# Patient Record
Sex: MALE | Race: WHITE | Employment: OTHER | URBAN - METROPOLITAN AREA
[De-identification: names, ages, dates, MRNs, and addresses within clinical notes are randomized per-mention and may not be internally consistent; named-entity substitution may affect disease eponyms.]

---

## 2018-05-18 ENCOUNTER — OFFICE VISIT (OUTPATIENT)
Dept: FAMILY MEDICINE CLINIC | Age: 33
End: 2018-05-18

## 2018-05-18 VITALS
HEART RATE: 86 BPM | SYSTOLIC BLOOD PRESSURE: 132 MMHG | WEIGHT: 315 LBS | DIASTOLIC BLOOD PRESSURE: 70 MMHG | TEMPERATURE: 98.8 F | RESPIRATION RATE: 16 BRPM | BODY MASS INDEX: 44.1 KG/M2 | OXYGEN SATURATION: 98 % | HEIGHT: 71 IN

## 2018-05-18 DIAGNOSIS — F32.A DEPRESSION, UNSPECIFIED DEPRESSION TYPE: ICD-10-CM

## 2018-05-18 DIAGNOSIS — R19.8 GI SYMPTOMS: Primary | ICD-10-CM

## 2018-05-18 DIAGNOSIS — Z86.19 HISTORY OF HEPATITIS C: ICD-10-CM

## 2018-05-18 PROCEDURE — G8427 DOCREV CUR MEDS BY ELIG CLIN: HCPCS | Performed by: FAMILY MEDICINE

## 2018-05-18 PROCEDURE — 99203 OFFICE O/P NEW LOW 30 MIN: CPT | Performed by: FAMILY MEDICINE

## 2018-05-18 PROCEDURE — 1036F TOBACCO NON-USER: CPT | Performed by: FAMILY MEDICINE

## 2018-05-18 PROCEDURE — G8417 CALC BMI ABV UP PARAM F/U: HCPCS | Performed by: FAMILY MEDICINE

## 2018-05-18 RX ORDER — IBUPROFEN 200 MG
200 TABLET ORAL EVERY 6 HOURS PRN
COMMUNITY
End: 2020-09-25

## 2018-05-18 RX ORDER — SIMETHICONE 80 MG
80 TABLET,CHEWABLE ORAL EVERY 6 HOURS PRN
Qty: 30 TABLET | Refills: 0 | Status: SHIPPED | OUTPATIENT
Start: 2018-05-18 | End: 2020-09-25

## 2018-05-18 RX ORDER — DICYCLOMINE HYDROCHLORIDE 10 MG/1
10 CAPSULE ORAL
Qty: 30 CAPSULE | Refills: 0 | Status: SHIPPED | OUTPATIENT
Start: 2018-05-18 | End: 2020-09-25

## 2018-05-18 RX ORDER — PANTOPRAZOLE SODIUM 20 MG/1
20 TABLET, DELAYED RELEASE ORAL DAILY PRN
Qty: 30 TABLET | Refills: 0 | Status: SHIPPED | OUTPATIENT
Start: 2018-05-18 | End: 2020-09-25

## 2018-05-21 ENCOUNTER — TELEPHONE (OUTPATIENT)
Dept: FAMILY MEDICINE CLINIC | Age: 33
End: 2018-05-21

## 2018-05-24 ASSESSMENT — ENCOUNTER SYMPTOMS
HEARTBURN: 1
ABDOMINAL PAIN: 1
RESPIRATORY NEGATIVE: 1

## 2018-05-30 ENCOUNTER — HOSPITAL ENCOUNTER (OUTPATIENT)
Dept: OTHER | Age: 33
Discharge: HOME | End: 2018-05-30
Attending: FAMILY MEDICINE | Admitting: FAMILY MEDICINE

## 2018-05-30 DIAGNOSIS — Z86.19 HISTORY OF HEPATITIS C: ICD-10-CM

## 2018-05-30 DIAGNOSIS — R19.8 GI SYMPTOMS: ICD-10-CM

## 2018-05-30 LAB
A/G RATIO: 1.4 (ref 1.1–2.2)
ALBUMIN SERPL-MCNC: 4.5 G/DL (ref 3.4–5)
ALP BLD-CCNC: 60 U/L (ref 40–129)
ALT SERPL-CCNC: 72 U/L (ref 10–40)
ANION GAP SERPL CALCULATED.3IONS-SCNC: 19 MMOL/L (ref 3–16)
AST SERPL-CCNC: 75 U/L (ref 15–37)
BASOPHILS ABSOLUTE: 0 K/UL (ref 0–0.2)
BASOPHILS RELATIVE PERCENT: 0.6 %
BILIRUB SERPL-MCNC: 0.7 MG/DL (ref 0–1)
BUN BLDV-MCNC: 10 MG/DL (ref 7–20)
CALCIUM SERPL-MCNC: 9.4 MG/DL (ref 8.3–10.6)
CHLORIDE BLD-SCNC: 100 MMOL/L (ref 99–110)
CO2: 23 MMOL/L (ref 21–32)
CREAT SERPL-MCNC: 0.7 MG/DL (ref 0.9–1.3)
EOSINOPHILS ABSOLUTE: 0 K/UL (ref 0–0.6)
EOSINOPHILS RELATIVE PERCENT: 0.5 %
GFR AFRICAN AMERICAN: >60
GFR NON-AFRICAN AMERICAN: >60
GLOBULIN: 3.2 G/DL
GLUCOSE BLD-MCNC: 104 MG/DL (ref 70–99)
HCT VFR BLD CALC: 42.9 % (ref 40.5–52.5)
HEMOGLOBIN: 14.8 G/DL (ref 13.5–17.5)
LIPASE: 42 U/L (ref 13–60)
LYMPHOCYTES ABSOLUTE: 1.5 K/UL (ref 1–5.1)
LYMPHOCYTES RELATIVE PERCENT: 20.5 %
MCH RBC QN AUTO: 28.1 PG (ref 26–34)
MCHC RBC AUTO-ENTMCNC: 34.5 G/DL (ref 31–36)
MCV RBC AUTO: 81.3 FL (ref 80–100)
MONOCYTES ABSOLUTE: 0.5 K/UL (ref 0–1.3)
MONOCYTES RELATIVE PERCENT: 6.5 %
NEUTROPHILS ABSOLUTE: 5.4 K/UL (ref 1.7–7.7)
NEUTROPHILS RELATIVE PERCENT: 71.9 %
PDW BLD-RTO: 16 % (ref 12.4–15.4)
PLATELET # BLD: 190 K/UL (ref 135–450)
PMV BLD AUTO: 8.6 FL (ref 5–10.5)
POTASSIUM SERPL-SCNC: 3.5 MMOL/L (ref 3.5–5.1)
RBC # BLD: 5.27 M/UL (ref 4.2–5.9)
SODIUM BLD-SCNC: 142 MMOL/L (ref 136–145)
TOTAL PROTEIN: 7.7 G/DL (ref 6.4–8.2)
WBC # BLD: 7.5 K/UL (ref 4–11)

## 2018-05-31 LAB
ESTIMATED AVERAGE GLUCOSE: 102.5 MG/DL
HBA1C MFR BLD: 5.2 %

## 2018-06-01 LAB
EER HCV RNA QNT PCR: NORMAL
HCV RNA QNT REAL-TIME PCR INTERP: NOT DETECTED
HCV RNA, QUANTITATIVE REAL TIME PCR: <1.2 LOG IU
HEPATITIS C RNA PCR QUANT: <15 IU/ML

## 2018-06-18 ENCOUNTER — TELEPHONE (OUTPATIENT)
Dept: FAMILY MEDICINE CLINIC | Age: 33
End: 2018-06-18

## 2018-06-18 DIAGNOSIS — R94.5 ABNORMAL LIVER FUNCTION: Primary | ICD-10-CM

## 2018-06-18 DIAGNOSIS — Z86.19 HISTORY OF HEPATITIS C: Primary | ICD-10-CM

## 2020-09-25 ENCOUNTER — APPOINTMENT (OUTPATIENT)
Dept: CT IMAGING | Age: 35
End: 2020-09-25
Payer: COMMERCIAL

## 2020-09-25 ENCOUNTER — HOSPITAL ENCOUNTER (EMERGENCY)
Age: 35
Discharge: HOME | End: 2020-09-25
Attending: EMERGENCY MEDICINE
Payer: COMMERCIAL

## 2020-09-25 ENCOUNTER — APPOINTMENT (OUTPATIENT)
Dept: ULTRASOUND IMAGING | Age: 35
End: 2020-09-25
Payer: COMMERCIAL

## 2020-09-25 VITALS
TEMPERATURE: 98.1 F | HEIGHT: 70 IN | WEIGHT: 295 LBS | DIASTOLIC BLOOD PRESSURE: 81 MMHG | HEART RATE: 88 BPM | BODY MASS INDEX: 42.23 KG/M2 | OXYGEN SATURATION: 97 % | RESPIRATION RATE: 14 BRPM | SYSTOLIC BLOOD PRESSURE: 132 MMHG

## 2020-09-25 LAB
A/G RATIO: 1.5 (ref 1.1–2.2)
ALBUMIN SERPL-MCNC: 4.4 G/DL (ref 3.4–5)
ALP BLD-CCNC: 69 U/L (ref 40–129)
ALT SERPL-CCNC: 20 U/L (ref 10–40)
ANION GAP SERPL CALCULATED.3IONS-SCNC: 8 MMOL/L (ref 3–16)
AST SERPL-CCNC: 19 U/L (ref 15–37)
BASOPHILS ABSOLUTE: 0.1 K/UL (ref 0–0.2)
BASOPHILS RELATIVE PERCENT: 0.6 %
BILIRUB SERPL-MCNC: 0.3 MG/DL (ref 0–1)
BILIRUBIN URINE: NEGATIVE
BLOOD, URINE: ABNORMAL
BUN BLDV-MCNC: 13 MG/DL (ref 7–20)
CALCIUM SERPL-MCNC: 9.7 MG/DL (ref 8.3–10.6)
CHLORIDE BLD-SCNC: 101 MMOL/L (ref 99–110)
CLARITY: CLEAR
CO2: 24 MMOL/L (ref 21–32)
COLOR: YELLOW
CREAT SERPL-MCNC: 0.8 MG/DL (ref 0.9–1.3)
EOSINOPHILS ABSOLUTE: 0.1 K/UL (ref 0–0.6)
EOSINOPHILS RELATIVE PERCENT: 0.8 %
EPITHELIAL CELLS, UA: 0 /HPF (ref 0–5)
GFR AFRICAN AMERICAN: >60
GFR NON-AFRICAN AMERICAN: >60
GLOBULIN: 2.9 G/DL
GLUCOSE BLD-MCNC: 111 MG/DL (ref 70–99)
GLUCOSE URINE: NEGATIVE MG/DL
HCT VFR BLD CALC: 43.7 % (ref 40.5–52.5)
HEMOGLOBIN: 15.2 G/DL (ref 13.5–17.5)
HYALINE CASTS: 1 /LPF (ref 0–8)
KETONES, URINE: NEGATIVE MG/DL
LEUKOCYTE ESTERASE, URINE: NEGATIVE
LIPASE: 31 U/L (ref 13–60)
LYMPHOCYTES ABSOLUTE: 1.3 K/UL (ref 1–5.1)
LYMPHOCYTES RELATIVE PERCENT: 14.3 %
MCH RBC QN AUTO: 28.6 PG (ref 26–34)
MCHC RBC AUTO-ENTMCNC: 34.7 G/DL (ref 31–36)
MCV RBC AUTO: 82.3 FL (ref 80–100)
MICROSCOPIC EXAMINATION: YES
MONOCYTES ABSOLUTE: 0.6 K/UL (ref 0–1.3)
MONOCYTES RELATIVE PERCENT: 6.4 %
NEUTROPHILS ABSOLUTE: 6.9 K/UL (ref 1.7–7.7)
NEUTROPHILS RELATIVE PERCENT: 77.9 %
NITRITE, URINE: NEGATIVE
PDW BLD-RTO: 14.4 % (ref 12.4–15.4)
PH UA: 7 (ref 5–8)
PLATELET # BLD: 230 K/UL (ref 135–450)
PMV BLD AUTO: 8 FL (ref 5–10.5)
POTASSIUM SERPL-SCNC: 4.3 MMOL/L (ref 3.5–5.1)
PROTEIN UA: NEGATIVE MG/DL
RBC # BLD: 5.31 M/UL (ref 4.2–5.9)
RBC UA: 7 /HPF (ref 0–4)
SODIUM BLD-SCNC: 133 MMOL/L (ref 136–145)
SPECIFIC GRAVITY UA: 1.03 (ref 1–1.03)
TOTAL PROTEIN: 7.3 G/DL (ref 6.4–8.2)
URINE REFLEX TO CULTURE: ABNORMAL
URINE TYPE: ABNORMAL
UROBILINOGEN, URINE: 1 E.U./DL
WBC # BLD: 8.8 K/UL (ref 4–11)
WBC UA: 1 /HPF (ref 0–5)

## 2020-09-25 PROCEDURE — 83690 ASSAY OF LIPASE: CPT

## 2020-09-25 PROCEDURE — 93975 VASCULAR STUDY: CPT

## 2020-09-25 PROCEDURE — 80053 COMPREHEN METABOLIC PANEL: CPT

## 2020-09-25 PROCEDURE — 76870 US EXAM SCROTUM: CPT

## 2020-09-25 PROCEDURE — 81001 URINALYSIS AUTO W/SCOPE: CPT

## 2020-09-25 PROCEDURE — 74176 CT ABD & PELVIS W/O CONTRAST: CPT

## 2020-09-25 PROCEDURE — 85025 COMPLETE CBC W/AUTO DIFF WBC: CPT

## 2020-09-25 PROCEDURE — 99284 EMERGENCY DEPT VISIT MOD MDM: CPT

## 2020-09-25 RX ORDER — CLOTRIMAZOLE AND BETAMETHASONE DIPROPIONATE 10; .64 MG/G; MG/G
CREAM TOPICAL 2 TIMES DAILY
Status: ON HOLD | COMMUNITY
Start: 2019-05-25 | End: 2022-04-15 | Stop reason: HOSPADM

## 2020-09-25 RX ORDER — MELOXICAM 15 MG/1
TABLET ORAL
COMMUNITY
Start: 2020-02-24 | End: 2021-03-08

## 2020-09-25 RX ORDER — BUPROPION HYDROCHLORIDE 75 MG/1
75 TABLET ORAL 2 TIMES DAILY
COMMUNITY

## 2020-09-25 RX ORDER — HYDROCODONE BITARTRATE AND ACETAMINOPHEN 5; 325 MG/1; MG/1
1 TABLET ORAL EVERY 6 HOURS PRN
Qty: 10 TABLET | Refills: 0 | Status: SHIPPED | OUTPATIENT
Start: 2020-09-25 | End: 2020-09-28

## 2020-09-25 RX ORDER — TAMSULOSIN HYDROCHLORIDE 0.4 MG/1
0.4 CAPSULE ORAL DAILY
Qty: 10 CAPSULE | Refills: 0 | Status: SHIPPED | OUTPATIENT
Start: 2020-09-25 | End: 2020-11-15 | Stop reason: SDUPTHER

## 2020-09-25 RX ORDER — GABAPENTIN 400 MG/1
300 CAPSULE ORAL 2 TIMES DAILY
COMMUNITY

## 2020-09-25 RX ORDER — ONDANSETRON 4 MG/1
4-8 TABLET, ORALLY DISINTEGRATING ORAL EVERY 12 HOURS PRN
Qty: 12 TABLET | Refills: 0 | Status: SHIPPED | OUTPATIENT
Start: 2020-09-25 | End: 2020-11-15 | Stop reason: SDUPTHER

## 2020-09-25 RX ORDER — ATOMOXETINE 40 MG/1
CAPSULE ORAL
Status: ON HOLD | COMMUNITY
Start: 2020-03-21 | End: 2022-04-15 | Stop reason: HOSPADM

## 2020-09-25 RX ORDER — CLONIDINE HYDROCHLORIDE 0.1 MG/1
TABLET ORAL 2 TIMES DAILY
COMMUNITY
Start: 2020-03-11

## 2020-09-25 ASSESSMENT — PAIN DESCRIPTION - PAIN TYPE: TYPE: ACUTE PAIN

## 2020-09-25 ASSESSMENT — PAIN DESCRIPTION - LOCATION: LOCATION: GROIN

## 2020-09-25 ASSESSMENT — PAIN SCALES - GENERAL: PAINLEVEL_OUTOF10: 6

## 2020-09-25 NOTE — ED PROVIDER NOTES
This patient was seen by the Mid-Level Provider. I have seen and examined the patient, agree with the workup, evaluation, management and diagnosis. Care plan has been discussed. My assessment reveals a 35-year-old male who presents with left-sided groin pain. This is a 35-year-old male presents with pain around his left lower abdominal area. The patient states he feels like it rates from his groin up into his abdomen. He denies any testicular pain. He denies any other complaints. The patient states he has a history of kidney stones and this may feel like one. He also has been urinating some \"blood\". Radiology results:    CT ABDOMEN PELVIS WO CONTRAST   Preliminary Result   1. Findings consistent with presence of 4 mm left distal ureteral calculus   with no significant associated left-sided hydronephrosis/hydroureter. 2. No evidence of radiopaque intrarenal calculi. US SCROTUM AND TESTICLES   Final Result   Testicles are normal in appearance. Small echogenic focus within the right hemiscrotum likely a scrotal deborah. Very small amount of fluid within the right hemiscrotum noted. This is   minimally complex in its appearance. No increased vascularity of the   testicle or epididymis noted to suggest acute infection. Findings may   represent sequela of prior infection or trauma. Please correlate with   history. US DUP ABD PEL RETRO SCROT COMPLETE   Final Result   Testicles are normal in appearance. Small echogenic focus within the right hemiscrotum likely a scrotal deborah. Very small amount of fluid within the right hemiscrotum noted. This is   minimally complex in its appearance. No increased vascularity of the   testicle or epididymis noted to suggest acute infection. Findings may   represent sequela of prior infection or trauma. Please correlate with   history.                LABS:    Labs Reviewed   COMPREHENSIVE METABOLIC PANEL - Abnormal; Notable for the following components:       Result Value    Sodium 133 (*)     Glucose 111 (*)     CREATININE 0.8 (*)     All other components within normal limits    Narrative:     Performed at:  [facility]  [facility]   Phone [phone]   URINE RT REFLEX TO CULTURE - Abnormal; Notable for the following components:    Blood, Urine TRACE (*)     All other components within normal limits    Narrative:     Performed at:  [facility]  [facility]   Phone [phone] - Abnormal; Notable for the following components:    RBC, UA 7 (*)     All other components within normal limits    Narrative:     Performed at:  [facility]  [facility]   Phone [phone]   CBC WITH AUTO DIFFERENTIAL    Narrative:     Performed at:  [facility]  [facility]   Phone [phone]   LIPASE    Narrative:     Performed at:  [facility]  [facility]   Phone [phone]               Exam:    Well-nourished male in no acute distress. Abdomen is soft and benign with no guarding or rebound. Medical decision makin-year-old male presents with left lower quadrant abdominal pain. Ultrasounds were obtained of the patient's scrotum and testicles that showed no evidence of torsion. The patient had a possible finding consistent with remote injury. His testicular exam here is unremarkable and normal.  A CT will also be obtained is currently pending. Addendum:  Ct shows 4mm left sided ureterolithiasis. Patient discharged with appropriate followup and to return if worse. FINAL IMPRESSION:    1.  Fabiola Henson MD  [phone]

## 2020-09-25 NOTE — ED PROVIDER NOTES
[facility]        Pt Name: Sergio Baldwin  MRN: [id number]  [place] 1985  Date of evaluation: 9/25/2020  Provider: Alejandra Jarrell PA-C  PCP: No primary care provider on file. I have seen and evaluated this patient with my supervising physician Angella Wolfe MD.    [address] [place] [address]       Chief Complaint   Patient presents with    Groin Pain     Pt says,\" I have hx of kidney stones and now Urinating blood. \"       HISTORY OF PRESENT ILLNESS   (Location, Timing/Onset, Context/Setting, Quality, Duration, Modifying Factors, Severity, Associated Signs and Symptoms)  Note limiting factors. Sergio Baldwin is a 28 y.o. male that presents to the emergency department with a chief complaint of some left flank pain and scrotal and testicular pain with hematuria. He states the pain began a couple days ago with hematuria yesterday. States he had some similar pain about 2 months ago but this got better by itself. About 10 years ago he had a 3 mm kidney stone but was able to pass this. Never required lithotripsy or stent. Denies any history of abdominal surgeries. States he was slightly nauseous last night but denies any nausea today or any vomiting. At the time only rates the pain a 4 out of 10. Took some meloxicam this morning. Denies dysuria. Has history of IBS and has intermittent constipation and diarrhea but denies any change in this. Denies bloody stools, fevers, chest pain, shortness of breath, testicular swelling or penile swelling. Seems like the pain is worse at night. Denies any other aggravating alleviating factors. Nursing Notes were all reviewed and agreed with or any disagreements were addressed in the HPI. REVIEW OF SYSTEMS    (2-9 systems for level 4, 10 or more for level 5)     Review of Systems    Positives and Pertinent negatives as per HPI.   Except as noted above in the ROS, all other systems were reviewed and negative. PAST MEDICAL HISTORY     Past Medical History:   Diagnosis Date    ADHD     Depression     Hep C w/ coma, chronic (HCC)     Hypertension     Kidney stone          SURGICAL HISTORY   History reviewed. No pertinent surgical history. [address]       Discharge Medication List as of 9/25/2020  4:21 PM      CONTINUE these medications which have NOT CHANGED    Details   atomoxetine (STRATTERA) 40 MG capsule Historical Med      cloNIDine (CATAPRES) 0.1 MG tablet Historical Med      clotrimazole-betamethasone (LOTRISONE) 1-0.05 % cream Apply topically 2 times daily, Topical, 2 TIMES DAILY Starting Sat 5/25/2019, Historical Med      meloxicam (MOBIC) 15 MG tablet TAKE 1 TABLET BY MOUTH EVERY DAYHistorical Med      buPROPion (WELLBUTRIN) 75 MG tablet Take 75 mg by mouth 2 times dailyHistorical Med      gabapentin (NEURONTIN) 400 MG capsule Take 400 mg by mouth 3 times daily. Historical Med               ALLERGIES     Patient has no known allergies. FAMILYHISTORY       Family History   Problem Relation Age of Onset    Diabetes Mother     No Known Problems Father     Diabetes Maternal Grandfather     Diabetes Paternal Grandfather     Cancer Other         Ear, throat cancer          SOCIAL HISTORY       Social History     Tobacco Use    Smoking status: Never Smoker    Smokeless tobacco: Never Used   Substance Use Topics    Alcohol use: Not Currently     Comment: stopped a year ago    Drug use: Yes     Frequency: 7.0 times per week     Types: Marijuana       SCREENINGS             PHYSICAL EXAM    (up to 7 for level 4, 8 or more for level 5)     ED Triage Vitals [09/25/20 1235]   BP Temp Temp Source Pulse Resp SpO2 Height Weight   (!) 158/74 98.1 °F (36.7 °C) Oral 88 14 98 % 5' 10\" (1.778 m) 295 lb (133.8 kg)       Physical Exam  Vitals signs and nursing note reviewed. Constitutional:       Appearance: He is well-developed. He is not diaphoretic.    HENT:      Head: Atraumatic. Nose: Nose normal.   Eyes:      General:         Right eye: No discharge. Left eye: No discharge. Neck:      Musculoskeletal: Normal range of motion. Cardiovascular:      Rate and Rhythm: Normal rate and regular rhythm. Heart sounds: No murmur. No friction rub. No gallop. Pulmonary:      Effort: Pulmonary effort is normal. No respiratory distress. Breath sounds: No stridor. No wheezing, rhonchi or rales. Abdominal:      General: Bowel sounds are normal. There is no distension. Palpations: Abdomen is soft. There is no mass. Tenderness: There is no abdominal tenderness. There is left CVA tenderness. There is no right CVA tenderness, guarding or rebound. Hernia: No hernia is present. Genitourinary:     Penis: Normal.       Comments: No testicular tenderness, mass or nodule. Normal lie of testicles. No penile swelling, rash, erythema or vesicles. Musculoskeletal: Normal range of motion. General: No swelling. Skin:     General: Skin is warm and dry. Findings: No erythema or rash. Neurological:      Mental Status: He is alert and oriented to person, place, and time. Cranial Nerves: No cranial nerve deficit.    Psychiatric:         Behavior: Behavior normal.         DIAGNOSTIC RESULTS   LABS:    Labs Reviewed   COMPREHENSIVE METABOLIC PANEL - Abnormal; Notable for the following components:       Result Value    Sodium 133 (*)     Glucose 111 (*)     CREATININE 0.8 (*)     All other components within normal limits    Narrative:     Performed at:  [facility]  [facility], [address]   Phone [phone]   URINE RT REFLEX TO CULTURE - Abnormal; Notable for the following components:    Blood, Urine TRACE (*)     All other components within normal limits    Narrative:     Performed at:  [facility]  [facility], [address]   Phone [phone] MICROSCOPIC URINALYSIS - Abnormal; Notable for the following components:    RBC, UA 7 (*)     All other components within normal limits    Narrative:     Performed at:  [facility]  [address]  [place], [place] [address] [place]   Phone [phone]   CBC WITH AUTO DIFFERENTIAL    Narrative:     Performed at:  [facility]  [address] [facility],  [place], [address]   Phone [phone]   LIPASE    Narrative:     Performed at:  [facility]  [address] [facility]  [place], [place] [address] [place]   Phone [phone]       All other labs were within normal range or not returned as of this dictation. EKG: All EKG's are interpreted by the Emergency Department Physician in the absence of a cardiologist.  Please see their note for interpretation of EKG. RADIOLOGY:   Non-plain film images such as CT, Ultrasound and MRI are read by the radiologist. Plain radiographic images are visualized and preliminarily interpreted by the ED Provider with the below findings:        Interpretation per the Radiologist below, if available at the time of this note:    CT ABDOMEN PELVIS WO CONTRAST   Preliminary Result   1. Findings consistent with presence of 4 mm left distal ureteral calculus   with no significant associated left-sided hydronephrosis/hydroureter. 2. No evidence of radiopaque intrarenal calculi. US SCROTUM AND TESTICLES   Final Result   Testicles are normal in appearance. Small echogenic focus within the right hemiscrotum likely a scrotal deborah. Very small amount of fluid within the right hemiscrotum noted. This is   minimally complex in its appearance. No increased vascularity of the   testicle or epididymis noted to suggest acute infection. Findings may   represent sequela of prior infection or trauma. Please correlate with   history.          US DUP ABD PEL RETRO SCROT COMPLETE   Final Result   Testicles are normal in appearance. Small echogenic focus within the right hemiscrotum likely a scrotal deborah. Very small amount of fluid within the right hemiscrotum noted. This is   minimally complex in its appearance. No increased vascularity of the   testicle or epididymis noted to suggest acute infection. Findings may   represent sequela of prior infection or trauma. Please correlate with   history. PROCEDURES   Unless otherwise noted below, none     Procedures    CRITICAL CARE TIME   N/A    CONSULTS:  None      EMERGENCY DEPARTMENT COURSE and DIFFERENTIAL DIAGNOSIS/MDM:   Vitals:    Vitals:    09/25/20 1235 09/25/20 1300   BP: (!) 158/74 132/81   Pulse: 88    Resp: 14    Temp: 98.1 °F (36.7 °C)    TempSrc: Oral    SpO2: 98% 97%   Weight: 295 lb (133.8 kg)    Height: 5' 10\" (1.778 m)        Patient was given the following medications:  Medications - No data to display        Patient presented some left flank pain and hematuria with some pain that goes into his scrotum. There is no testicular tenderness or obvious sign of trauma or infection or testicular torsion on exam.  Ultrasound reveals possible trauma or old infection. Patient states he had an abscess multiple years ago but denies any injury or trauma. There is no sign of testicular torsion. CT imaging reveals a 4 mm distal calculus. Will be discharged with urine strainer, Flomax, Norco.  He takes meloxicam at home. Will follow-up with urology. Low suspicion for pyelonephritis, AAA, obstruction, perforated viscus or infected stone. Patient will follow-up as an outpatient return here for any worsening of symptoms or problems at home. FINAL IMPRESSION      1.  Ureterolithiasis          DISPOSITION/PLAN   DISPOSITION Decision To Discharge 09/25/2020 04:18:08 PM      PATIENT REFERREDTO:  Luciano Pollock MD  [address] [facility]  [phone]    Schedule an appointment as soon as possible for a visit   3-5 days, For re-check    [facility] [address] [facility] Emergency Department  [address] [phone]    As needed      DISCHARGE MEDICATIONS:  Discharge Medication List as of 9/25/2020  4:21 PM      START taking these medications    Details   tamsulosin (FLOMAX) 0.4 MG capsule Take 1 capsule by mouth daily for 10 days, Disp-10 capsule,R-0Print      HYDROcodone-acetaminophen (NORCO) 5-325 MG per tablet Take 1 tablet by mouth every 6 hours as needed for Pain for up to 3 days. , Disp-10 tablet,R-0Print      ondansetron (ZOFRAN ODT) 4 MG disintegrating tablet Take 1-2 tablets by mouth every 12 hours as needed for Nausea May Sub regular tablet (non-ODT) if insurance does not cover ODT., Disp-12 tablet,R-0Print             DISCONTINUED MEDICATIONS:  Discharge Medication List as of 9/25/2020  4:21 PM      STOP taking these medications       ibuprofen (ADVIL;MOTRIN) 200 MG tablet Comments:   Reason for Stopping:         bismuth subsalicylate (PEPTO-BISMOL) 262 MG/15ML suspension Comments:   Reason for Stopping:         pantoprazole (PROTONIX) 20 MG tablet Comments:   Reason for Stopping:         simethicone (MYLICON) 80 MG chewable tablet Comments:   Reason for Stopping:         dicyclomine (BENTYL) 10 MG capsule Comments:   Reason for Stopping:                      (Please note that portions of this note were completed with a voice recognition program.  Efforts were made to edit the dictations but occasionally words are mis-transcribed.)    Chi Juarez PA-C (electronically signed)           Chi Juarez PA-C  09/25/20 1276

## 2020-11-15 ENCOUNTER — APPOINTMENT (OUTPATIENT)
Dept: CT IMAGING | Age: 35
End: 2020-11-15
Payer: COMMERCIAL

## 2020-11-15 ENCOUNTER — HOSPITAL ENCOUNTER (EMERGENCY)
Age: 35
Discharge: HOME | End: 2020-11-15
Attending: FAMILY MEDICINE
Payer: COMMERCIAL

## 2020-11-15 VITALS
DIASTOLIC BLOOD PRESSURE: 85 MMHG | TEMPERATURE: 97 F | RESPIRATION RATE: 18 BRPM | HEIGHT: 70 IN | BODY MASS INDEX: 42.23 KG/M2 | WEIGHT: 295 LBS | OXYGEN SATURATION: 96 % | SYSTOLIC BLOOD PRESSURE: 158 MMHG | HEART RATE: 61 BPM

## 2020-11-15 LAB
A/G RATIO: 1.4 (ref 1.1–2.2)
ALBUMIN SERPL-MCNC: 4.4 G/DL (ref 3.4–5)
ALP BLD-CCNC: 76 U/L (ref 40–129)
ALT SERPL-CCNC: 28 U/L (ref 10–40)
ANION GAP SERPL CALCULATED.3IONS-SCNC: 11 MMOL/L (ref 3–16)
AST SERPL-CCNC: 24 U/L (ref 15–37)
BASOPHILS ABSOLUTE: 0.1 K/UL (ref 0–0.2)
BASOPHILS RELATIVE PERCENT: 0.5 %
BILIRUB SERPL-MCNC: <0.2 MG/DL (ref 0–1)
BILIRUBIN URINE: NEGATIVE
BLOOD, URINE: ABNORMAL
BUN BLDV-MCNC: 15 MG/DL (ref 7–20)
CALCIUM SERPL-MCNC: 10.1 MG/DL (ref 8.3–10.6)
CHLORIDE BLD-SCNC: 103 MMOL/L (ref 99–110)
CLARITY: CLEAR
CO2: 25 MMOL/L (ref 21–32)
COLOR: YELLOW
CREAT SERPL-MCNC: 0.9 MG/DL (ref 0.9–1.3)
EOSINOPHILS ABSOLUTE: 0.3 K/UL (ref 0–0.6)
EOSINOPHILS RELATIVE PERCENT: 2.6 %
EPITHELIAL CELLS, UA: 0 /HPF (ref 0–5)
GFR AFRICAN AMERICAN: >60
GFR NON-AFRICAN AMERICAN: >60
GLOBULIN: 3.1 G/DL
GLUCOSE BLD-MCNC: 125 MG/DL (ref 70–99)
GLUCOSE URINE: NEGATIVE MG/DL
HCT VFR BLD CALC: 44.8 % (ref 40.5–52.5)
HEMOGLOBIN: 15 G/DL (ref 13.5–17.5)
HYALINE CASTS: 1 /LPF (ref 0–8)
KETONES, URINE: NEGATIVE MG/DL
LEUKOCYTE ESTERASE, URINE: NEGATIVE
LIPASE: 48 U/L (ref 13–60)
LYMPHOCYTES ABSOLUTE: 2.6 K/UL (ref 1–5.1)
LYMPHOCYTES RELATIVE PERCENT: 20.8 %
MCH RBC QN AUTO: 27.5 PG (ref 26–34)
MCHC RBC AUTO-ENTMCNC: 33.5 G/DL (ref 31–36)
MCV RBC AUTO: 82.1 FL (ref 80–100)
MICROSCOPIC EXAMINATION: YES
MONOCYTES ABSOLUTE: 1 K/UL (ref 0–1.3)
MONOCYTES RELATIVE PERCENT: 8.4 %
NEUTROPHILS ABSOLUTE: 8.4 K/UL (ref 1.7–7.7)
NEUTROPHILS RELATIVE PERCENT: 67.7 %
NITRITE, URINE: NEGATIVE
PDW BLD-RTO: 14.8 % (ref 12.4–15.4)
PH UA: 6 (ref 5–8)
PLATELET # BLD: 260 K/UL (ref 135–450)
PMV BLD AUTO: 8 FL (ref 5–10.5)
POTASSIUM SERPL-SCNC: 4.2 MMOL/L (ref 3.5–5.1)
PROTEIN UA: NEGATIVE MG/DL
RBC # BLD: 5.45 M/UL (ref 4.2–5.9)
RBC UA: 2 /HPF (ref 0–4)
SODIUM BLD-SCNC: 139 MMOL/L (ref 136–145)
SPECIFIC GRAVITY UA: 1.02 (ref 1–1.03)
TOTAL PROTEIN: 7.5 G/DL (ref 6.4–8.2)
URINE REFLEX TO CULTURE: ABNORMAL
URINE TYPE: ABNORMAL
UROBILINOGEN, URINE: 0.2 E.U./DL
WBC # BLD: 12.4 K/UL (ref 4–11)
WBC UA: 2 /HPF (ref 0–5)

## 2020-11-15 PROCEDURE — 96375 TX/PRO/DX INJ NEW DRUG ADDON: CPT

## 2020-11-15 PROCEDURE — 6360000002 HC RX W HCPCS: Performed by: FAMILY MEDICINE

## 2020-11-15 PROCEDURE — 74176 CT ABD & PELVIS W/O CONTRAST: CPT

## 2020-11-15 PROCEDURE — 81001 URINALYSIS AUTO W/SCOPE: CPT

## 2020-11-15 PROCEDURE — 85025 COMPLETE CBC W/AUTO DIFF WBC: CPT

## 2020-11-15 PROCEDURE — 96376 TX/PRO/DX INJ SAME DRUG ADON: CPT

## 2020-11-15 PROCEDURE — 99283 EMERGENCY DEPT VISIT LOW MDM: CPT

## 2020-11-15 PROCEDURE — 96374 THER/PROPH/DIAG INJ IV PUSH: CPT

## 2020-11-15 PROCEDURE — 80053 COMPREHEN METABOLIC PANEL: CPT

## 2020-11-15 PROCEDURE — 83690 ASSAY OF LIPASE: CPT

## 2020-11-15 PROCEDURE — 2580000003 HC RX 258: Performed by: FAMILY MEDICINE

## 2020-11-15 RX ORDER — NAPROXEN 500 MG/1
500 TABLET ORAL 2 TIMES DAILY
Qty: 80 TABLET | Refills: 0 | Status: SHIPPED | OUTPATIENT
Start: 2020-11-15 | End: 2021-03-08 | Stop reason: SDUPTHER

## 2020-11-15 RX ORDER — KETOROLAC TROMETHAMINE 30 MG/ML
30 INJECTION, SOLUTION INTRAMUSCULAR; INTRAVENOUS ONCE
Status: COMPLETED | OUTPATIENT
Start: 2020-11-15 | End: 2020-11-15

## 2020-11-15 RX ORDER — TAMSULOSIN HYDROCHLORIDE 0.4 MG/1
0.4 CAPSULE ORAL DAILY
Qty: 10 CAPSULE | Refills: 0 | Status: ON HOLD | OUTPATIENT
Start: 2020-11-15 | End: 2022-04-15 | Stop reason: HOSPADM

## 2020-11-15 RX ORDER — ONDANSETRON 2 MG/ML
4 INJECTION INTRAMUSCULAR; INTRAVENOUS EVERY 30 MIN PRN
Status: DISCONTINUED | OUTPATIENT
Start: 2020-11-15 | End: 2020-11-15 | Stop reason: HOSPADM

## 2020-11-15 RX ORDER — HYDROCODONE BITARTRATE AND ACETAMINOPHEN 5; 325 MG/1; MG/1
1 TABLET ORAL EVERY 6 HOURS PRN
Qty: 12 TABLET | Refills: 0 | Status: SHIPPED | OUTPATIENT
Start: 2020-11-15 | End: 2020-11-18

## 2020-11-15 RX ORDER — ONDANSETRON 4 MG/1
4-8 TABLET, ORALLY DISINTEGRATING ORAL EVERY 12 HOURS PRN
Qty: 12 TABLET | Refills: 0 | Status: ON HOLD | OUTPATIENT
Start: 2020-11-15 | End: 2022-04-15 | Stop reason: HOSPADM

## 2020-11-15 RX ORDER — 0.9 % SODIUM CHLORIDE 0.9 %
1000 INTRAVENOUS SOLUTION INTRAVENOUS ONCE
Status: COMPLETED | OUTPATIENT
Start: 2020-11-15 | End: 2020-11-15

## 2020-11-15 RX ORDER — MORPHINE SULFATE 4 MG/ML
4 INJECTION, SOLUTION INTRAMUSCULAR; INTRAVENOUS EVERY 30 MIN PRN
Status: DISCONTINUED | OUTPATIENT
Start: 2020-11-15 | End: 2020-11-15 | Stop reason: HOSPADM

## 2020-11-15 RX ADMIN — MORPHINE SULFATE 4 MG: 4 INJECTION, SOLUTION INTRAMUSCULAR; INTRAVENOUS at 05:39

## 2020-11-15 RX ADMIN — MORPHINE SULFATE 4 MG: 4 INJECTION, SOLUTION INTRAMUSCULAR; INTRAVENOUS at 05:00

## 2020-11-15 RX ADMIN — SODIUM CHLORIDE 1000 ML: 9 INJECTION, SOLUTION INTRAVENOUS at 05:00

## 2020-11-15 RX ADMIN — ONDANSETRON 4 MG: 2 INJECTION INTRAMUSCULAR; INTRAVENOUS at 05:00

## 2020-11-15 RX ADMIN — KETOROLAC TROMETHAMINE 30 MG: 30 INJECTION, SOLUTION INTRAMUSCULAR at 05:00

## 2020-11-15 ASSESSMENT — PAIN DESCRIPTION - ORIENTATION: ORIENTATION: LEFT

## 2020-11-15 ASSESSMENT — PAIN SCALES - GENERAL
PAINLEVEL_OUTOF10: 10

## 2020-11-15 ASSESSMENT — PAIN DESCRIPTION - PAIN TYPE: TYPE: ACUTE PAIN

## 2020-11-15 ASSESSMENT — PAIN DESCRIPTION - LOCATION: LOCATION: FLANK

## 2020-11-16 NOTE — ED PROVIDER NOTES
[address]  [place] [address] [address]  Dept: [phone]  Loc: [phone]    [address] [place] [address]    Chief Complaint   Patient presents with    Flank Pain     Confirmed kidney stone x1 month ago, pain went away, and the pain has returned over the past few days to his left flank area. Pt unable to fully empty his bladder, only dribbles. WILBERT Mitchell is a 28 y.o. male who presents with left flank pain. Onset was [place]. The duration has been  since the onset. The patient has had associated nausea and vomiting. The pain severity is 8/10. The quality is sharp. There are no aggravating or alleviating factors. REVIEW OF SYSTEMS    Musculoskeletal: No midline back pain or extremity injury  : + dysuria, + hematuria  GI: no abdominal pain, no diarrhea  General: No fevers or chills  Neurologic: No bowel or bladder incontinence, No saddle anesthesia, No leg weakness  All other systems reviewed and are negative. PAST MEDICAL & SURGICAL HISTORY    Past Medical History:   Diagnosis Date    ADHD     Depression     Hep C w/ coma, chronic (HCC)     Hypertension     Kidney stone      No past surgical history on file. CURRENT MEDICATIONS  (may include discharge medications prescribed in the ED)  Current Outpatient Rx   Medication Sig Dispense Refill    naproxen (NAPROSYN) 500 MG tablet Take 1 tablet by mouth 2 times daily 80 tablet 0    HYDROcodone-acetaminophen (NORCO) 5-325 MG per tablet Take 1 tablet by mouth every 6 hours as needed for Pain for up to 3 days. Intended supply: 7 days.  Take lowest dose possible to manage pain 12 tablet 0    tamsulosin (FLOMAX) 0.4 MG capsule Take 1 capsule by mouth daily for 10 days 10 capsule 0    ondansetron (ZOFRAN ODT) 4 MG disintegrating tablet Take 1-2 tablets by mouth every 12 hours as needed for Nausea May Sub regular tablet (non-ODT) if insurance does not cover ODT. 12 tablet 0    atomoxetine (STRATTERA) 40 MG capsule       buPROPion (WELLBUTRIN) 75 MG tablet Take 75 mg by mouth 2 times daily      cloNIDine (CATAPRES) 0.1 MG tablet       clotrimazole-betamethasone (LOTRISONE) 1-0.05 % cream Apply topically 2 times daily      gabapentin (NEURONTIN) 400 MG capsule Take 400 mg by mouth 3 times daily.       meloxicam (MOBIC) 15 MG tablet TAKE 1 TABLET BY MOUTH EVERY DAY         ALLERGIES    No Known Allergies    SOCIAL HISTORY    Social History     Socioeconomic History    Marital status: Single     Spouse name: Not on file    Number of children: 1    Years of education: Not on file    Highest education level: Not on file   Occupational History    Not on file   Social Needs    Financial resource strain: Not on file    Food insecurity     Worry: Not on file     Inability: Not on file   [employer] needs     Medical: Not on file     Non-medical: Not on file   Tobacco Use    Smoking status: Never Smoker    Smokeless tobacco: Never Used   Substance and Sexual Activity    Alcohol use: Not Currently     Comment: stopped a year ago    Drug use: Yes     Frequency: 7.0 times per week     Types: Marijuana    Sexual activity: Yes     Partners: Female   Lifestyle    Physical activity     Days per week: Not on file     Minutes per session: Not on file    Stress: Not on file   Relationships    Social connections     Talks on phone: Not on file     Gets together: Not on file     Attends Qu[employer] service: Not on file     Active member of club or organization: Not on file     Attends meetings of clubs or organizations: Not on file     Relationship status: Not on file    Intimate partner violence     Fear of current or ex partner: Not on file     Emotionally abused: Not on file     Physically abused: Not on file     Forced sexual activity: Not on file   Other Topics Concern    Not on file   Social History Narrative    Not on file PHYSICAL EXAM    VITAL SIGNS: BP (!) 158/85   Pulse 61   Temp 97 °F (36.1 °C) (Oral)   Resp 18   Ht 5' 10\" (1.778 m)   Wt 295 lb (133.8 kg)   SpO2 96%   BMI 42.33 kg/m²    Constitutional:  Well developed, well nourished, patient appears uncomfortable  HENT:  Atraumatic, moist mucus membranes  Neck: No JVD, supple   Respiratory:   no respiratory distress  Cardiovascular:  regular rate,  no murmurs   GI:  Soft, no abdominal tenderness, no pulsatile masses  Musculoskeletal:  No edema, no acute deformities  Back: no soft tissue swelling, + left CVA tenderness   Integument:  Skin is warm and dry   Neurologic: Awake, alert and oriented, motor 5/5 bilaterally, gait normal.  No ataxia. RADIOLOGY/PROCEDURES    CT ABDOMEN PELVIS WO CONTRAST Additional Contrast? None   Final Result   3 mm renal stone at the left ureterovesical junction causing mild collecting   system dilatation. ED COURSE & MEDICAL DECISION MAKING    Pertinent Labs & Imaging studies interpreted and reviewed. (See chart for details)  See chart for details of medications given during the ED stay. Vitals:    11/15/20 0430   BP: (!) 158/85   Pulse: 61   Resp: 18   Temp: 97 °F (36.1 °C)   TempSrc: Oral   SpO2: 96%   Weight: 295 lb (133.8 kg)   Height: 5' 10\" (1.778 m)       Differential Diagnosis: Kidney Stone, Pyelonephritis, Renal Artery Aneurysm, Abdominal Aortic Aneurysm, Metastases to back, Mechanical Back Pain, Cauda Equina Syndrome    Patient is afebrile and nontoxic in appearance. Labs reveal no leukocytosis or anemia. Metabolic panel unremarkable. Lipase within normal limits. Urinalysis unremarkable. CT findings as above. Symptomatic treatment with fluids, morphine, Zofran, Toradol. Reevaluation prior to discharge patient is feeling dramatically better. He is tolerating p.o. Reviewed the results above, no urinary tract infection, no kidney dysfunction, need to follow-up with urology.     I instructed the patient to follow up as an outpatient with urology in 2 days. I instructed the patient to return to the ED immediately for any new or worsening symptoms. The patient verbalizes understanding. I estimate there is LOW risk for (including but not limited to) ACUTE APPENDICITIS, BOWEL OBSTRUCTION, ACUTE CHOLECYSTITIS, RUPTURED DIVERTICULITIS, INCARCERATED or STRANGULATED HERNIA, HEMMORHAGIC PANCREATITIS, or PERFORATED BOWEL/ULCER, thus I consider the discharge disposition reasonable. Yamilka Olivera (or their surrogate) and I have discussed the diagnosis and risks, and we agree with discharging home with close follow-up. We also discussed returning to the Emergency Department immediately if new or worsening symptoms occur. We have discussed the symptoms which are most concerning that necessitate immediate return.     FINAL IMPRESSION    1. Kidney stone        PLAN  Discharge with close outpatient follow-up (see EMR)       (Please note that this note was completed with a voice recognition program.  Every attempt was made to edit the dictations, but inevitably there remain words that are mis-transcribed.)        Justina Calle MD  11/16/20 0111

## 2021-03-08 ENCOUNTER — HOSPITAL ENCOUNTER (EMERGENCY)
Age: 36
Discharge: HOME | End: 2021-03-08
Payer: COMMERCIAL

## 2021-03-08 ENCOUNTER — APPOINTMENT (OUTPATIENT)
Dept: GENERAL RADIOLOGY | Age: 36
End: 2021-03-08
Payer: COMMERCIAL

## 2021-03-08 VITALS
BODY MASS INDEX: 46.63 KG/M2 | RESPIRATION RATE: 17 BRPM | WEIGHT: 307.7 LBS | OXYGEN SATURATION: 97 % | DIASTOLIC BLOOD PRESSURE: 72 MMHG | HEART RATE: 98 BPM | TEMPERATURE: 98.2 F | SYSTOLIC BLOOD PRESSURE: 155 MMHG | HEIGHT: 68 IN

## 2021-03-08 DIAGNOSIS — S46.211A RUPTURE OF RIGHT BICEPS TENDON, INITIAL ENCOUNTER: Primary | ICD-10-CM

## 2021-03-08 DIAGNOSIS — S62.650A CLOSED NONDISPLACED FRACTURE OF MIDDLE PHALANX OF RIGHT INDEX FINGER, INITIAL ENCOUNTER: ICD-10-CM

## 2021-03-08 PROCEDURE — 73140 X-RAY EXAM OF FINGER(S): CPT

## 2021-03-08 PROCEDURE — 99283 EMERGENCY DEPT VISIT LOW MDM: CPT

## 2021-03-08 PROCEDURE — 73070 X-RAY EXAM OF ELBOW: CPT

## 2021-03-08 PROCEDURE — 6370000000 HC RX 637 (ALT 250 FOR IP): Performed by: PHYSICIAN ASSISTANT

## 2021-03-08 PROCEDURE — 73060 X-RAY EXAM OF HUMERUS: CPT

## 2021-03-08 RX ORDER — LIDOCAINE 4 G/G
1 PATCH TOPICAL ONCE
Status: DISCONTINUED | OUTPATIENT
Start: 2021-03-08 | End: 2021-03-08 | Stop reason: HOSPADM

## 2021-03-08 RX ORDER — NAPROXEN 500 MG/1
500 TABLET ORAL 2 TIMES DAILY
Qty: 20 TABLET | Refills: 0 | Status: ON HOLD | OUTPATIENT
Start: 2021-03-08 | End: 2022-04-15 | Stop reason: HOSPADM

## 2021-03-08 RX ORDER — LIDOCAINE 50 MG/G
1 PATCH TOPICAL DAILY
Qty: 30 PATCH | Refills: 0 | Status: SHIPPED | OUTPATIENT
Start: 2021-03-08

## 2021-03-08 RX ORDER — HYDROCODONE BITARTRATE AND ACETAMINOPHEN 5; 325 MG/1; MG/1
1 TABLET ORAL EVERY 6 HOURS PRN
Qty: 10 TABLET | Refills: 0 | Status: SHIPPED | OUTPATIENT
Start: 2021-03-08 | End: 2021-03-11

## 2021-03-08 RX ORDER — HYDROCODONE BITARTRATE AND ACETAMINOPHEN 5; 325 MG/1; MG/1
1 TABLET ORAL ONCE
Status: COMPLETED | OUTPATIENT
Start: 2021-03-08 | End: 2021-03-08

## 2021-03-08 RX ADMIN — HYDROCODONE BITARTRATE AND ACETAMINOPHEN 1 TABLET: 5; 325 TABLET ORAL at 17:56

## 2021-03-08 ASSESSMENT — ENCOUNTER SYMPTOMS
SHORTNESS OF BREATH: 0
CONSTIPATION: 0
BACK PAIN: 0
STRIDOR: 0
NAUSEA: 0
ABDOMINAL DISTENTION: 0
COLOR CHANGE: 0
VOMITING: 0
WHEEZING: 0
ABDOMINAL PAIN: 0
DIARRHEA: 0
COUGH: 0

## 2021-03-08 NOTE — LETTER
[facility] Emergency Department  [address], [place], [address]             March 8, 2021    Patient: Ann Marie Madden   YOB: 1985   Date of Visit: 3/8/2021       To Whom It May Concern:    Ann Marie Madden was seen and treated in our emergency department on 3/8/2021. He may return to work on 03/12/2021. Pt must stay on light duty until cleared by ortho doctor.  .      Sincerely,         Giovanna Fam PA-C//

## 2021-03-08 NOTE — ED NOTES
Reviewed discharge instructions, home meds, and follow up care with patient, verbalized understanding.       Michael Toure RN  03/08/21 7170

## 2021-03-08 NOTE — ED PROVIDER NOTES
[facility]        Pt Name: Brynn Lombard  MRN: [id number]  [place] 1985  Date of evaluation: 3/8/2021  Provider: Macy Joyce PA-C  PCP: No primary care provider on file. SHERIDAN. I have evaluated this patient. My supervising physician was available for consultation. CHIEF COMPLAINT       Chief Complaint   Patient presents with    Arm Injury     right arm injury from work        HISTORY OF PRESENT ILLNESS   (Location, Timing/Onset, Context/Setting, Quality, Duration, Modifying Factors, Severity, Associated Signs and Symptoms)  Note limiting factors. 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 He thinks that he may have ruptured his biceps tendon. Motor and sensory function is preserved however has increased pain when he extends the arm. He denies numbness, tingling or weakness. Secondly, patient jammed his right index finger 1 week ago at work and he believes he dislocated it and then reduced it himself. He reports persistent swelling to the right finger. Denies any warmth, redness, numbness, tingling or weakness. He is right-hand dominant. Nursing Notes were all reviewed and agreed with or any disagreements were addressed in the HPI. REVIEW OF SYSTEMS    (2-9 systems for level 4, 10 or more for level 5)     Review of Systems   Constitutional: Negative for chills and fever. HENT: Negative. Eyes: Negative for visual disturbance. Respiratory: Negative for cough, shortness of breath, wheezing and stridor. Cardiovascular: Negative for chest pain, palpitations and leg swelling. Gastrointestinal: Negative for abdominal distention, abdominal pain, constipation, diarrhea, nausea and vomiting. Musculoskeletal: Positive for myalgias.  Negative for back pain, neck pain and neck stiffness. Skin: Negative for color change, pallor, rash and wound. Neurological: Negative for dizziness, tremors, seizures, syncope, facial asymmetry, speech difficulty, weakness, light-headedness, numbness and headaches. Psychiatric/Behavioral: Negative for confusion. All other systems reviewed and are negative. Positives and Pertinent negatives as per HPI. Except as noted above in the ROS, all other systems were reviewed and negative. PAST MEDICAL HISTORY     Past Medical History:   Diagnosis Date    ADHD     Depression     Hep C w/ coma, chronic (HCC)     Hypertension     Kidney stone          SURGICAL HISTORY   History reviewed. No pertinent surgical history. CURRENTMEDICATIONS       Previous Medications    ATOMOXETINE (STRATTERA) 40 MG CAPSULE        BUPROPION (WELLBUTRIN) 75 MG TABLET    Take 75 mg by mouth 2 times daily    CLONIDINE (CATAPRES) 0.1 MG TABLET        CLOTRIMAZOLE-BETAMETHASONE (LOTRISONE) 1-0.05 % CREAM    Apply topically 2 times daily    GABAPENTIN (NEURONTIN) 400 MG CAPSULE    Take 400 mg by mouth 3 times daily. ONDANSETRON (ZOFRAN ODT) 4 MG DISINTEGRATING TABLET    Take 1-2 tablets by mouth every 12 hours as needed for Nausea May Sub regular tablet (non-ODT) if insurance does not cover ODT. TAMSULOSIN (FLOMAX) 0.4 MG CAPSULE    Take 1 capsule by mouth daily for 10 days         ALLERGIES     Patient has no known allergies.     FAMILYHISTORY       Family History   Problem Relation Age of Onset    Diabetes Mother     No Known Problems Father     Diabetes Maternal Grandfather     Diabetes Paternal Grandfather     Cancer Other         Ear, throat cancer          SOCIAL HISTORY       Social History     Tobacco Use    Smoking status: Never Smoker    Smokeless tobacco: Never Used   Substance Use Topics    Alcohol use: Not Currently     Comment: stopped a year ago    Drug use: Yes     Frequency: 7.0 times per week     Types: Marijuana       SCREENINGS             PHYSICAL EXAM    (up to 7 for level 4, 8 or more for level 5)     ED Triage Vitals [03/08/21 1636]   BP Temp Temp src Pulse Resp SpO2 Height Weight   (!) 155/72 98.2 °F (36.8 °C) -- 104 17 97 % 5' 8\" (1.727 m) (!) 307 lb 11.2 oz (139.6 kg)       Physical Exam  Vitals signs and nursing note reviewed. Constitutional:       Appearance: Normal appearance. He is well-developed. He is not toxic-appearing or diaphoretic. HENT:      Head: Normocephalic and atraumatic. Right Ear: External ear normal.      Left Ear: External ear normal.      Nose: Nose normal.      Mouth/Throat:      Mouth: Mucous membranes are moist.      Pharynx: Oropharynx is clear. Eyes:      General: No scleral icterus. Right eye: No discharge. Left eye: No discharge. Extraocular Movements: Extraocular movements intact. Conjunctiva/sclera: Conjunctivae normal.      Pupils: Pupils are equal, round, and reactive to light. Neck:      Musculoskeletal: Normal range of motion. Cardiovascular:      Rate and Rhythm: Normal rate. Pulses:           Radial pulses are 2+ on the right side and 2+ on the left side. Pulmonary:      Effort: Pulmonary effort is normal.      Breath sounds: Normal breath sounds. Abdominal:      General: Bowel sounds are normal.      Palpations: Abdomen is soft. Tenderness: There is no abdominal tenderness. Musculoskeletal: Normal range of motion. Right shoulder: Normal.      Right elbow: Normal.     Right wrist: Normal.      Cervical back: Normal.      Right upper arm: He exhibits tenderness and deformity (distal biceps tendon deformity consistent with partial biceps tendon tear or rupture). He exhibits no bony tenderness, no swelling, no edema and no laceration. Right forearm: Normal.      Right hand: He exhibits tenderness (PIP joint of the right index finger) and swelling (mild soft tissue swelling over right index finger.  no step off deformity, poikilothermia, pallor, paresthesia, subungual hematoma, erythema, red streaking, wound). He exhibits normal range of motion, no bony tenderness, normal two-point discrimination, normal capillary refill, no deformity and no laceration. Normal sensation noted. Normal strength noted. Comments: Able to flex and extend at elbow with full mobility   Skin:     General: Skin is warm and dry. Capillary Refill: Capillary refill takes less than 2 seconds. Coloration: Skin is not jaundiced or pale. Findings: No bruising, erythema, lesion or rash. Neurological:      Mental Status: He is alert and oriented to person, place, and time. Mental status is at baseline. Sensory: No sensory deficit. Motor: No weakness. Deep Tendon Reflexes: Reflexes normal.   Psychiatric:         Attention and Perception: Attention and perception normal.         Mood and Affect: Mood is anxious (worried because he is self employed and worried this injury will affect his ability to work). Speech: Speech normal.         Behavior: Behavior is agitated. Behavior is cooperative. Thought Content: Thought content normal.         Cognition and Memory: Cognition and memory normal.         Judgment: Judgment normal.         DIAGNOSTIC RESULTS   LABS:    Labs Reviewed - No data to display    All other labs were within normal range or not returned as of this dictation. EKG: All EKG's are interpreted by the Emergency Department Physician in the absence of a cardiologist.  Please see their note for interpretation of EKG.       RADIOLOGY:   Non-plain film images such as CT, Ultrasound and MRI are read by the radiologist. Plain radiographic images are visualized and preliminarily interpreted by the ED Provider with the below findings:        Interpretation per the Radiologist below, if available at the time of this note:    XR HUMERUS RIGHT (MIN 2 VIEWS)   Final Result   No acute fracture of the right humerus or elbow. There is soft tissue   swelling in the antecubital region and possible retraction of the biceps   muscle. Although nonspecific, this may correlate with biceps tendon rupture. Acute avulsion fracture of the volar plate of the middle phalanx of the index   finger. XR ELBOW RIGHT (2 VIEWS)   Final Result   No acute fracture of the right humerus or elbow. There is soft tissue   swelling in the antecubital region and possible retraction of the biceps   muscle. Although nonspecific, this may correlate with biceps tendon rupture. Acute avulsion fracture of the volar plate of the middle phalanx of the index   finger. XR FINGER RIGHT (MIN 2 VIEWS)   Final Result   No acute fracture of the right humerus or elbow. There is soft tissue   swelling in the antecubital region and possible retraction of the biceps   muscle. Although nonspecific, this may correlate with biceps tendon rupture. Acute avulsion fracture of the volar plate of the middle phalanx of the index   finger. PROCEDURES   Unless otherwise noted below, none     Procedures    CRITICAL CARE TIME   N/A    CONSULTS:  None      EMERGENCY DEPARTMENT COURSE and DIFFERENTIAL DIAGNOSIS/MDM:   Vitals:    Vitals:    03/08/21 1636 03/08/21 1651   BP: (!) 155/72    Pulse: 104 98   Resp: 17    Temp: 98.2 °F (36.8 °C)    SpO2: 97%    Weight: (!) 307 lb 11.2 oz (139.6 kg)    Height: 5' 8\" (1.727 m)        Patient was given the following medications:  Medications   lidocaine 4 % external patch 1 patch (has no administration in time range)   HYDROcodone-acetaminophen (NORCO) 5-325 MG per tablet 1 tablet (has no administration in time range)           This patient presents to the emergency department with complaints of right index finger pain status post injury last week. X-ray shows right middle phalanx fracture. alumafoam splint applied.   It is very possible he may have dislocated the finger and then reduce it on his own but sustained ligamentous or tendon injury. He is advised to follow-up with orthopedist regarding this injury. Continue cryotherapy. Secondly, it appears that patient sustained a partial distal bicep tendon rupture/tear of the right upper extremity today at work. X-ray shows no acute osseous involvement but does show suspected biceps rupture. Sling applied. Follow up with [facility] for this work related injury and orthopedist.  Sent home with medication as needed for pain/inflammation. We have addressed his concerns and expectations. My suspicion is low for subungual hematoma, paronychia, eponychia, felon, flexor tenosynovitis, ACS, PE, thoracic aortic dissection, thoracic outlet obstruction, SVC syndrome, foreign body,  compartment syndrome, acute persistent dislocation, DVT, arterial compromise or occlusion, limb ischemia, gout, septic joint, abscess, cellulitis, osteomyelitis, or other concerning pathology. He does have substance abuse history but has not abused drugs for years and would like something stronger for pain. He will be sent home with short course of norco. Given first dose here. Wife is driving home. FINAL IMPRESSION      1. Rupture of right biceps tendon, initial encounter    2.  Closed nondisplaced fracture of middle phalanx of right index finger, initial encounter          DISPOSITION/PLAN   DISPOSITION Decision To Discharge 03/08/2021 05:08:53 PM      PATIENT REFERREDTO:  *[address]  [phone]      for follow up regarding this work related injury    [facility] Emergency Department  [address] [facility]  [phone]    If symptoms worsen    Janis Alfaro MD  [address] [place] [address] 1  [phone]      for orthopedic follow up in 1-2 days      DISCHARGE MEDICATIONS:  New Prescriptions    HYDROCODONE-ACETAMINOPHEN (NORCO) 5-325 MG PER TABLET    Take 1 tablet by mouth every 6 hours as needed for Pain for up to 3 days. LIDOCAINE (LIDODERM) 5 %    Place 1 patch onto the skin daily 12 hours on, 12 hours off.        DISCONTINUED MEDICATIONS:  Discontinued Medications    MELOXICAM (MOBIC) 15 MG TABLET    TAKE 1 TABLET BY MOUTH EVERY DAY              (Please note that portions of this note were completed with a voice recognition program.  Efforts were made to edit the dictations but occasionally words are mis-transcribed.)    Jacqueline Urrutia PA-C (electronically signed)           Jacqueline Urrutia PA-C  03/08/21 0341

## 2022-03-18 ENCOUNTER — HOSPITAL ENCOUNTER (OUTPATIENT)
Dept: ULTRASOUND IMAGING | Age: 37
Discharge: HOME | End: 2022-03-18
Payer: COMMERCIAL

## 2022-03-18 DIAGNOSIS — N50.9 TENDER SCROTUM: ICD-10-CM

## 2022-03-18 PROCEDURE — 76870 US EXAM SCROTUM: CPT

## 2022-04-04 ENCOUNTER — HOSPITAL ENCOUNTER (OUTPATIENT)
Age: 37
Discharge: HOME | End: 2022-04-04
Payer: COMMERCIAL

## 2022-04-04 LAB
ALBUMIN SERPL-MCNC: 4.3 G/DL (ref 3.4–5)
ALP BLD-CCNC: 128 U/L (ref 40–129)
ALT SERPL-CCNC: 197 U/L (ref 10–40)
ANION GAP SERPL CALCULATED.3IONS-SCNC: 14 MMOL/L (ref 3–16)
AST SERPL-CCNC: 228 U/L (ref 15–37)
BILIRUB SERPL-MCNC: 1.3 MG/DL (ref 0–1)
BILIRUBIN DIRECT: 0.5 MG/DL (ref 0–0.3)
BILIRUBIN, INDIRECT: 0.8 MG/DL (ref 0–1)
BUN BLDV-MCNC: 12 MG/DL (ref 7–20)
CALCIUM SERPL-MCNC: 9.2 MG/DL (ref 8.3–10.6)
CHLORIDE BLD-SCNC: 100 MMOL/L (ref 99–110)
CO2: 23 MMOL/L (ref 21–32)
CREAT SERPL-MCNC: 0.7 MG/DL (ref 0.9–1.3)
GFR AFRICAN AMERICAN: >60
GFR NON-AFRICAN AMERICAN: >60
GLUCOSE BLD-MCNC: 148 MG/DL (ref 70–99)
MAGNESIUM: 2.2 MG/DL (ref 1.8–2.4)
POTASSIUM SERPL-SCNC: 3.7 MMOL/L (ref 3.5–5.1)
SODIUM BLD-SCNC: 137 MMOL/L (ref 136–145)
TOTAL PROTEIN: 7.2 G/DL (ref 6.4–8.2)

## 2022-04-04 PROCEDURE — 83735 ASSAY OF MAGNESIUM: CPT

## 2022-04-04 PROCEDURE — 80076 HEPATIC FUNCTION PANEL: CPT

## 2022-04-04 PROCEDURE — 80048 BASIC METABOLIC PNL TOTAL CA: CPT

## 2022-04-04 PROCEDURE — 82607 VITAMIN B-12: CPT

## 2022-04-04 PROCEDURE — 36415 COLL VENOUS BLD VENIPUNCTURE: CPT

## 2022-04-04 PROCEDURE — 82306 VITAMIN D 25 HYDROXY: CPT

## 2022-04-04 PROCEDURE — 82746 ASSAY OF FOLIC ACID SERUM: CPT

## 2022-04-05 LAB
FOLATE: 6.91 NG/ML (ref 4.78–24.2)
VITAMIN B-12: 697 PG/ML (ref 211–911)
VITAMIN D 25-HYDROXY: 8.1 NG/ML

## 2022-04-13 ENCOUNTER — HOSPITAL ENCOUNTER (INPATIENT)
Age: 37
LOS: 2 days | Discharge: HOME | DRG: 280 | End: 2022-04-15
Attending: INTERNAL MEDICINE | Admitting: INTERNAL MEDICINE
Payer: COMMERCIAL

## 2022-04-13 ENCOUNTER — APPOINTMENT (OUTPATIENT)
Dept: CT IMAGING | Age: 37
DRG: 280 | End: 2022-04-13
Payer: COMMERCIAL

## 2022-04-13 DIAGNOSIS — E80.6 HYPERBILIRUBINEMIA: ICD-10-CM

## 2022-04-13 DIAGNOSIS — R74.01 TRANSAMINITIS: Primary | ICD-10-CM

## 2022-04-13 LAB
ACETAMINOPHEN LEVEL: <5 UG/ML (ref 10–30)
ALBUMIN SERPL-MCNC: 3.6 G/DL (ref 3.4–5)
ALP BLD-CCNC: 222 U/L (ref 40–129)
ALT SERPL-CCNC: 492 U/L (ref 10–40)
AMMONIA: 34 UMOL/L (ref 16–60)
AMPHETAMINE SCREEN, URINE: ABNORMAL
ANION GAP SERPL CALCULATED.3IONS-SCNC: 13 MMOL/L (ref 3–16)
APTT: 29.2 SEC (ref 26.2–38.6)
AST SERPL-CCNC: 685 U/L (ref 15–37)
BARBITURATE SCREEN URINE: ABNORMAL
BASOPHILS ABSOLUTE: 0 K/UL (ref 0–0.2)
BASOPHILS RELATIVE PERCENT: 0.5 %
BENZODIAZEPINE SCREEN, URINE: ABNORMAL
BILIRUB SERPL-MCNC: 8.6 MG/DL (ref 0–1)
BILIRUBIN DIRECT: 5.7 MG/DL (ref 0–0.3)
BILIRUBIN URINE: ABNORMAL
BILIRUBIN, INDIRECT: 2.9 MG/DL (ref 0–1)
BLOOD, URINE: ABNORMAL
BUN BLDV-MCNC: 10 MG/DL (ref 7–20)
CALCIUM SERPL-MCNC: 9.2 MG/DL (ref 8.3–10.6)
CANNABINOID SCREEN URINE: POSITIVE
CHLORIDE BLD-SCNC: 96 MMOL/L (ref 99–110)
CLARITY: ABNORMAL
CO2: 21 MMOL/L (ref 21–32)
COCAINE METABOLITE SCREEN URINE: ABNORMAL
COLOR: ABNORMAL
CREAT SERPL-MCNC: 0.7 MG/DL (ref 0.9–1.3)
EOSINOPHILS ABSOLUTE: 0 K/UL (ref 0–0.6)
EOSINOPHILS RELATIVE PERCENT: 0.3 %
EPITHELIAL CELLS, UA: 1 /HPF (ref 0–5)
ETHANOL: NORMAL MG/DL (ref 0–0.08)
GFR AFRICAN AMERICAN: >60
GFR NON-AFRICAN AMERICAN: >60
GLUCOSE BLD-MCNC: 129 MG/DL (ref 70–99)
GLUCOSE URINE: ABNORMAL MG/DL
HCT VFR BLD CALC: 42.8 % (ref 40.5–52.5)
HEMOGLOBIN: 14.2 G/DL (ref 13.5–17.5)
HYALINE CASTS: 9 /LPF (ref 0–8)
INR BLD: 1.04 (ref 0.88–1.12)
KETONES, URINE: ABNORMAL MG/DL
LEUKOCYTE ESTERASE, URINE: ABNORMAL
LIPASE: 52 U/L (ref 13–60)
LYMPHOCYTES ABSOLUTE: 1.6 K/UL (ref 1–5.1)
LYMPHOCYTES RELATIVE PERCENT: 24.5 %
Lab: ABNORMAL
MCH RBC QN AUTO: 28 PG (ref 26–34)
MCHC RBC AUTO-ENTMCNC: 33.1 G/DL (ref 31–36)
MCV RBC AUTO: 84.4 FL (ref 80–100)
METHADONE SCREEN, URINE: ABNORMAL
MICROSCOPIC EXAMINATION: YES
MONOCYTES ABSOLUTE: 0.3 K/UL (ref 0–1.3)
MONOCYTES RELATIVE PERCENT: 4.7 %
NEUTROPHILS ABSOLUTE: 4.5 K/UL (ref 1.7–7.7)
NEUTROPHILS RELATIVE PERCENT: 70 %
NITRITE, URINE: ABNORMAL
OPIATE SCREEN URINE: ABNORMAL
OXYCODONE URINE: ABNORMAL
PDW BLD-RTO: 15.8 % (ref 12.4–15.4)
PH UA: 6
PH UA: ABNORMAL (ref 5–8)
PHENCYCLIDINE SCREEN URINE: ABNORMAL
PLATELET # BLD: 104 K/UL (ref 135–450)
PMV BLD AUTO: 8.1 FL (ref 5–10.5)
POTASSIUM SERPL-SCNC: 3.5 MMOL/L (ref 3.5–5.1)
PROPOXYPHENE SCREEN: ABNORMAL
PROTEIN UA: ABNORMAL MG/DL
PROTHROMBIN TIME: 11.8 SEC (ref 9.9–12.7)
RBC # BLD: 5.07 M/UL (ref 4.2–5.9)
RBC UA: 6 /HPF (ref 0–4)
SODIUM BLD-SCNC: 130 MMOL/L (ref 136–145)
SPECIFIC GRAVITY UA: 1.02 (ref 1–1.03)
TOTAL PROTEIN: 6.7 G/DL (ref 6.4–8.2)
URINE REFLEX TO CULTURE: ABNORMAL
URINE TYPE: ABNORMAL
UROBILINOGEN, URINE: ABNORMAL E.U./DL
WBC # BLD: 6.4 K/UL (ref 4–11)
WBC UA: 5 /HPF (ref 0–5)

## 2022-04-13 PROCEDURE — 80076 HEPATIC FUNCTION PANEL: CPT

## 2022-04-13 PROCEDURE — 80143 DRUG ASSAY ACETAMINOPHEN: CPT

## 2022-04-13 PROCEDURE — 81001 URINALYSIS AUTO W/SCOPE: CPT

## 2022-04-13 PROCEDURE — 36415 COLL VENOUS BLD VENIPUNCTURE: CPT

## 2022-04-13 PROCEDURE — 85610 PROTHROMBIN TIME: CPT

## 2022-04-13 PROCEDURE — 6370000000 HC RX 637 (ALT 250 FOR IP): Performed by: PHYSICIAN ASSISTANT

## 2022-04-13 PROCEDURE — 82077 ASSAY SPEC XCP UR&BREATH IA: CPT

## 2022-04-13 PROCEDURE — 80048 BASIC METABOLIC PNL TOTAL CA: CPT

## 2022-04-13 PROCEDURE — 99285 EMERGENCY DEPT VISIT HI MDM: CPT

## 2022-04-13 PROCEDURE — 74177 CT ABD & PELVIS W/CONTRAST: CPT

## 2022-04-13 PROCEDURE — 96374 THER/PROPH/DIAG INJ IV PUSH: CPT

## 2022-04-13 PROCEDURE — 80074 ACUTE HEPATITIS PANEL: CPT

## 2022-04-13 PROCEDURE — 82140 ASSAY OF AMMONIA: CPT

## 2022-04-13 PROCEDURE — 1200000000 HC SEMI PRIVATE

## 2022-04-13 PROCEDURE — 6360000002 HC RX W HCPCS: Performed by: NURSE PRACTITIONER

## 2022-04-13 PROCEDURE — 6360000004 HC RX CONTRAST MEDICATION: Performed by: PHYSICIAN ASSISTANT

## 2022-04-13 PROCEDURE — 80307 DRUG TEST PRSMV CHEM ANLYZR: CPT

## 2022-04-13 PROCEDURE — 85730 THROMBOPLASTIN TIME PARTIAL: CPT

## 2022-04-13 PROCEDURE — 83690 ASSAY OF LIPASE: CPT

## 2022-04-13 PROCEDURE — 85025 COMPLETE CBC W/AUTO DIFF WBC: CPT

## 2022-04-13 RX ORDER — LORAZEPAM 2 MG/ML
1 INJECTION INTRAMUSCULAR ONCE
Status: COMPLETED | OUTPATIENT
Start: 2022-04-13 | End: 2022-04-13

## 2022-04-13 RX ORDER — PROMETHAZINE HYDROCHLORIDE 25 MG/1
25 TABLET ORAL ONCE
Status: COMPLETED | OUTPATIENT
Start: 2022-04-13 | End: 2022-04-13

## 2022-04-13 RX ADMIN — LORAZEPAM 1 MG: 2 INJECTION INTRAMUSCULAR; INTRAVENOUS at 22:12

## 2022-04-13 RX ADMIN — IOPAMIDOL 75 ML: 755 INJECTION, SOLUTION INTRAVENOUS at 20:16

## 2022-04-13 RX ADMIN — PROMETHAZINE HYDROCHLORIDE 25 MG: 25 TABLET ORAL at 19:43

## 2022-04-13 ASSESSMENT — ENCOUNTER SYMPTOMS
NAUSEA: 1
COUGH: 0
ABDOMINAL PAIN: 1
RHINORRHEA: 0
RESPIRATORY NEGATIVE: 1
SHORTNESS OF BREATH: 0
VOMITING: 0
DIARRHEA: 0

## 2022-04-14 ENCOUNTER — APPOINTMENT (OUTPATIENT)
Dept: ULTRASOUND IMAGING | Age: 37
DRG: 280 | End: 2022-04-14
Payer: COMMERCIAL

## 2022-04-14 LAB
A/G RATIO: 1.3 (ref 1.1–2.2)
ALBUMIN SERPL-MCNC: 3.2 G/DL (ref 3.4–5)
ALP BLD-CCNC: 197 U/L (ref 40–129)
ALT SERPL-CCNC: 382 U/L (ref 10–40)
ANION GAP SERPL CALCULATED.3IONS-SCNC: 12 MMOL/L (ref 3–16)
ANISOCYTOSIS: ABNORMAL
AST SERPL-CCNC: 568 U/L (ref 15–37)
BASOPHILS ABSOLUTE: 0 K/UL (ref 0–0.2)
BASOPHILS RELATIVE PERCENT: 0.5 %
BILIRUB SERPL-MCNC: 8.2 MG/DL (ref 0–1)
BUN BLDV-MCNC: 9 MG/DL (ref 7–20)
CALCIUM SERPL-MCNC: 8.6 MG/DL (ref 8.3–10.6)
CHLORIDE BLD-SCNC: 99 MMOL/L (ref 99–110)
CO2: 22 MMOL/L (ref 21–32)
CREAT SERPL-MCNC: 0.6 MG/DL (ref 0.9–1.3)
EOSINOPHILS ABSOLUTE: 0 K/UL (ref 0–0.6)
EOSINOPHILS RELATIVE PERCENT: 0.4 %
FERRITIN: 2740 NG/ML (ref 30–400)
GFR AFRICAN AMERICAN: >60
GFR NON-AFRICAN AMERICAN: >60
GLUCOSE BLD-MCNC: 130 MG/DL (ref 70–99)
HCT VFR BLD CALC: 38.7 % (ref 40.5–52.5)
HEMOGLOBIN: 12.9 G/DL (ref 13.5–17.5)
IRON SATURATION: 91 % (ref 20–50)
IRON: 153 UG/DL (ref 59–158)
LYMPHOCYTES ABSOLUTE: 1.5 K/UL (ref 1–5.1)
LYMPHOCYTES RELATIVE PERCENT: 24.8 %
MAGNESIUM: 2.2 MG/DL (ref 1.8–2.4)
MCH RBC QN AUTO: 28.7 PG (ref 26–34)
MCHC RBC AUTO-ENTMCNC: 33.3 G/DL (ref 31–36)
MCV RBC AUTO: 86.2 FL (ref 80–100)
MONOCYTES ABSOLUTE: 0.4 K/UL (ref 0–1.3)
MONOCYTES RELATIVE PERCENT: 6.6 %
NEUTROPHILS ABSOLUTE: 4.1 K/UL (ref 1.7–7.7)
NEUTROPHILS RELATIVE PERCENT: 67.7 %
PDW BLD-RTO: 15.7 % (ref 12.4–15.4)
PLATELET # BLD: 87 K/UL (ref 135–450)
PLATELET SLIDE REVIEW: ABNORMAL
PMV BLD AUTO: 8.4 FL (ref 5–10.5)
POLYCHROMASIA: ABNORMAL
POTASSIUM REFLEX MAGNESIUM: 3 MMOL/L (ref 3.5–5.1)
RBC # BLD: 4.48 M/UL (ref 4.2–5.9)
SLIDE REVIEW: ABNORMAL
SODIUM BLD-SCNC: 133 MMOL/L (ref 136–145)
STOMATOCYTES: ABNORMAL
TEAR DROP CELLS: ABNORMAL
TOTAL IRON BINDING CAPACITY: 168 UG/DL (ref 260–445)
TOTAL PROTEIN: 5.6 G/DL (ref 6.4–8.2)
WBC # BLD: 6.1 K/UL (ref 4–11)

## 2022-04-14 PROCEDURE — 6370000000 HC RX 637 (ALT 250 FOR IP): Performed by: NURSE PRACTITIONER

## 2022-04-14 PROCEDURE — 85025 COMPLETE CBC W/AUTO DIFF WBC: CPT

## 2022-04-14 PROCEDURE — 82728 ASSAY OF FERRITIN: CPT

## 2022-04-14 PROCEDURE — 83550 IRON BINDING TEST: CPT

## 2022-04-14 PROCEDURE — 36415 COLL VENOUS BLD VENIPUNCTURE: CPT

## 2022-04-14 PROCEDURE — 6370000000 HC RX 637 (ALT 250 FOR IP): Performed by: INTERNAL MEDICINE

## 2022-04-14 PROCEDURE — 2580000003 HC RX 258: Performed by: NURSE PRACTITIONER

## 2022-04-14 PROCEDURE — 1200000000 HC SEMI PRIVATE

## 2022-04-14 PROCEDURE — 83735 ASSAY OF MAGNESIUM: CPT

## 2022-04-14 PROCEDURE — 6360000002 HC RX W HCPCS: Performed by: NURSE PRACTITIONER

## 2022-04-14 PROCEDURE — 6360000002 HC RX W HCPCS: Performed by: INTERNAL MEDICINE

## 2022-04-14 PROCEDURE — 86038 ANTINUCLEAR ANTIBODIES: CPT

## 2022-04-14 PROCEDURE — 83540 ASSAY OF IRON: CPT

## 2022-04-14 PROCEDURE — 80053 COMPREHEN METABOLIC PANEL: CPT

## 2022-04-14 PROCEDURE — 82390 ASSAY OF CERULOPLASMIN: CPT

## 2022-04-14 PROCEDURE — 76705 ECHO EXAM OF ABDOMEN: CPT

## 2022-04-14 RX ORDER — THIAMINE HYDROCHLORIDE 100 MG/ML
100 INJECTION, SOLUTION INTRAMUSCULAR; INTRAVENOUS DAILY
Status: DISCONTINUED | OUTPATIENT
Start: 2022-04-14 | End: 2022-04-15 | Stop reason: HOSPADM

## 2022-04-14 RX ORDER — CLONIDINE HYDROCHLORIDE 0.1 MG/1
0.1 TABLET ORAL 2 TIMES DAILY
Status: DISCONTINUED | OUTPATIENT
Start: 2022-04-14 | End: 2022-04-15 | Stop reason: HOSPADM

## 2022-04-14 RX ORDER — SODIUM CHLORIDE 0.9 % (FLUSH) 0.9 %
5-40 SYRINGE (ML) INJECTION PRN
Status: DISCONTINUED | OUTPATIENT
Start: 2022-04-14 | End: 2022-04-15 | Stop reason: HOSPADM

## 2022-04-14 RX ORDER — LORAZEPAM 2 MG/ML
4 INJECTION INTRAMUSCULAR
Status: DISCONTINUED | OUTPATIENT
Start: 2022-04-14 | End: 2022-04-15 | Stop reason: HOSPADM

## 2022-04-14 RX ORDER — ONDANSETRON 4 MG/1
4 TABLET, ORALLY DISINTEGRATING ORAL EVERY 8 HOURS PRN
Status: DISCONTINUED | OUTPATIENT
Start: 2022-04-14 | End: 2022-04-15 | Stop reason: HOSPADM

## 2022-04-14 RX ORDER — SODIUM CHLORIDE, SODIUM LACTATE, POTASSIUM CHLORIDE, CALCIUM CHLORIDE 600; 310; 30; 20 MG/100ML; MG/100ML; MG/100ML; MG/100ML
INJECTION, SOLUTION INTRAVENOUS CONTINUOUS
Status: ACTIVE | OUTPATIENT
Start: 2022-04-14 | End: 2022-04-14

## 2022-04-14 RX ORDER — GABAPENTIN 100 MG/1
100 CAPSULE ORAL 2 TIMES DAILY
Status: DISCONTINUED | OUTPATIENT
Start: 2022-04-14 | End: 2022-04-15 | Stop reason: HOSPADM

## 2022-04-14 RX ORDER — SODIUM CHLORIDE 9 MG/ML
INJECTION, SOLUTION INTRAVENOUS PRN
Status: DISCONTINUED | OUTPATIENT
Start: 2022-04-14 | End: 2022-04-15 | Stop reason: HOSPADM

## 2022-04-14 RX ORDER — BUPROPION HYDROCHLORIDE 150 MG/1
150 TABLET, EXTENDED RELEASE ORAL 2 TIMES DAILY
Status: DISCONTINUED | OUTPATIENT
Start: 2022-04-14 | End: 2022-04-15 | Stop reason: HOSPADM

## 2022-04-14 RX ORDER — LORAZEPAM 2 MG/ML
1 INJECTION INTRAMUSCULAR
Status: DISCONTINUED | OUTPATIENT
Start: 2022-04-14 | End: 2022-04-15 | Stop reason: HOSPADM

## 2022-04-14 RX ORDER — POLYETHYLENE GLYCOL 3350 17 G/17G
17 POWDER, FOR SOLUTION ORAL DAILY PRN
Status: DISCONTINUED | OUTPATIENT
Start: 2022-04-14 | End: 2022-04-15 | Stop reason: HOSPADM

## 2022-04-14 RX ORDER — GABAPENTIN 300 MG/1
300 CAPSULE ORAL 2 TIMES DAILY
Status: DISCONTINUED | OUTPATIENT
Start: 2022-04-14 | End: 2022-04-14

## 2022-04-14 RX ORDER — POTASSIUM CHLORIDE 7.45 MG/ML
10 INJECTION INTRAVENOUS PRN
Status: DISCONTINUED | OUTPATIENT
Start: 2022-04-14 | End: 2022-04-15 | Stop reason: HOSPADM

## 2022-04-14 RX ORDER — LORAZEPAM 1 MG/1
2 TABLET ORAL
Status: DISCONTINUED | OUTPATIENT
Start: 2022-04-14 | End: 2022-04-15 | Stop reason: HOSPADM

## 2022-04-14 RX ORDER — LORAZEPAM 1 MG/1
4 TABLET ORAL
Status: DISCONTINUED | OUTPATIENT
Start: 2022-04-14 | End: 2022-04-15 | Stop reason: HOSPADM

## 2022-04-14 RX ORDER — LORAZEPAM 2 MG/ML
3 INJECTION INTRAMUSCULAR
Status: DISCONTINUED | OUTPATIENT
Start: 2022-04-14 | End: 2022-04-15 | Stop reason: HOSPADM

## 2022-04-14 RX ORDER — LORAZEPAM 2 MG/ML
2 INJECTION INTRAMUSCULAR
Status: DISCONTINUED | OUTPATIENT
Start: 2022-04-14 | End: 2022-04-15 | Stop reason: HOSPADM

## 2022-04-14 RX ORDER — BUPROPION HYDROCHLORIDE 75 MG/1
75 TABLET ORAL 2 TIMES DAILY
Status: DISCONTINUED | OUTPATIENT
Start: 2022-04-14 | End: 2022-04-14

## 2022-04-14 RX ORDER — LORAZEPAM 1 MG/1
1 TABLET ORAL
Status: DISCONTINUED | OUTPATIENT
Start: 2022-04-14 | End: 2022-04-15 | Stop reason: HOSPADM

## 2022-04-14 RX ORDER — CLONIDINE HYDROCHLORIDE 0.1 MG/1
0.1 TABLET ORAL ONCE
Status: COMPLETED | OUTPATIENT
Start: 2022-04-14 | End: 2022-04-14

## 2022-04-14 RX ORDER — SODIUM CHLORIDE 0.9 % (FLUSH) 0.9 %
5-40 SYRINGE (ML) INJECTION EVERY 12 HOURS SCHEDULED
Status: DISCONTINUED | OUTPATIENT
Start: 2022-04-14 | End: 2022-04-15 | Stop reason: HOSPADM

## 2022-04-14 RX ORDER — MULTIVITAMIN WITH IRON
1 TABLET ORAL DAILY
Status: DISCONTINUED | OUTPATIENT
Start: 2022-04-14 | End: 2022-04-15 | Stop reason: HOSPADM

## 2022-04-14 RX ORDER — MAGNESIUM SULFATE 1 G/100ML
1000 INJECTION INTRAVENOUS PRN
Status: DISCONTINUED | OUTPATIENT
Start: 2022-04-14 | End: 2022-04-15 | Stop reason: HOSPADM

## 2022-04-14 RX ORDER — ACETAMINOPHEN 325 MG/1
650 TABLET ORAL EVERY 6 HOURS PRN
Status: DISCONTINUED | OUTPATIENT
Start: 2022-04-14 | End: 2022-04-15 | Stop reason: HOSPADM

## 2022-04-14 RX ORDER — POTASSIUM CHLORIDE 20 MEQ/1
40 TABLET, EXTENDED RELEASE ORAL PRN
Status: DISCONTINUED | OUTPATIENT
Start: 2022-04-14 | End: 2022-04-15 | Stop reason: HOSPADM

## 2022-04-14 RX ORDER — GABAPENTIN 400 MG/1
400 CAPSULE ORAL 3 TIMES DAILY
Status: DISCONTINUED | OUTPATIENT
Start: 2022-04-14 | End: 2022-04-14

## 2022-04-14 RX ORDER — ACETAMINOPHEN 650 MG/1
650 SUPPOSITORY RECTAL EVERY 6 HOURS PRN
Status: DISCONTINUED | OUTPATIENT
Start: 2022-04-14 | End: 2022-04-15 | Stop reason: HOSPADM

## 2022-04-14 RX ORDER — LORAZEPAM 1 MG/1
3 TABLET ORAL
Status: DISCONTINUED | OUTPATIENT
Start: 2022-04-14 | End: 2022-04-15 | Stop reason: HOSPADM

## 2022-04-14 RX ORDER — ONDANSETRON 2 MG/ML
4 INJECTION INTRAMUSCULAR; INTRAVENOUS EVERY 6 HOURS PRN
Status: DISCONTINUED | OUTPATIENT
Start: 2022-04-14 | End: 2022-04-15 | Stop reason: HOSPADM

## 2022-04-14 RX ADMIN — GABAPENTIN 100 MG: 100 CAPSULE ORAL at 08:50

## 2022-04-14 RX ADMIN — SODIUM CHLORIDE, POTASSIUM CHLORIDE, SODIUM LACTATE AND CALCIUM CHLORIDE: 600; 310; 30; 20 INJECTION, SOLUTION INTRAVENOUS at 01:17

## 2022-04-14 RX ADMIN — ONDANSETRON 4 MG: 2 INJECTION INTRAMUSCULAR; INTRAVENOUS at 16:26

## 2022-04-14 RX ADMIN — CLONIDINE HYDROCHLORIDE 0.1 MG: 0.1 TABLET ORAL at 23:11

## 2022-04-14 RX ADMIN — POTASSIUM CHLORIDE 10 MEQ: 7.46 INJECTION, SOLUTION INTRAVENOUS at 14:52

## 2022-04-14 RX ADMIN — GABAPENTIN 100 MG: 100 CAPSULE ORAL at 21:36

## 2022-04-14 RX ADMIN — POTASSIUM CHLORIDE 10 MEQ: 7.46 INJECTION, SOLUTION INTRAVENOUS at 19:00

## 2022-04-14 RX ADMIN — POTASSIUM CHLORIDE 10 MEQ: 7.46 INJECTION, SOLUTION INTRAVENOUS at 11:35

## 2022-04-14 RX ADMIN — ENOXAPARIN SODIUM 40 MG: 40 INJECTION SUBCUTANEOUS at 08:50

## 2022-04-14 RX ADMIN — BUPROPION HYDROCHLORIDE 150 MG: 150 TABLET, EXTENDED RELEASE ORAL at 01:25

## 2022-04-14 RX ADMIN — SODIUM CHLORIDE, PRESERVATIVE FREE 10 ML: 5 INJECTION INTRAVENOUS at 21:36

## 2022-04-14 RX ADMIN — THIAMINE HYDROCHLORIDE 100 MG: 100 INJECTION, SOLUTION INTRAMUSCULAR; INTRAVENOUS at 11:28

## 2022-04-14 RX ADMIN — GABAPENTIN 300 MG: 300 CAPSULE ORAL at 01:25

## 2022-04-14 RX ADMIN — POTASSIUM CHLORIDE 10 MEQ: 7.46 INJECTION, SOLUTION INTRAVENOUS at 16:15

## 2022-04-14 RX ADMIN — BUPROPION HYDROCHLORIDE 150 MG: 150 TABLET, EXTENDED RELEASE ORAL at 08:50

## 2022-04-14 RX ADMIN — POTASSIUM CHLORIDE 10 MEQ: 7.46 INJECTION, SOLUTION INTRAVENOUS at 13:47

## 2022-04-14 RX ADMIN — POTASSIUM CHLORIDE 10 MEQ: 7.46 INJECTION, SOLUTION INTRAVENOUS at 12:35

## 2022-04-14 RX ADMIN — BUPROPION HYDROCHLORIDE 150 MG: 150 TABLET, EXTENDED RELEASE ORAL at 21:36

## 2022-04-14 RX ADMIN — LORAZEPAM 2 MG: 2 INJECTION INTRAMUSCULAR; INTRAVENOUS at 01:10

## 2022-04-14 RX ADMIN — THERA TABS 1 TABLET: TAB at 08:51

## 2022-04-14 RX ADMIN — LORAZEPAM 1 MG: 2 INJECTION INTRAMUSCULAR; INTRAVENOUS at 23:11

## 2022-04-14 RX ADMIN — LORAZEPAM 2 MG: 1 TABLET ORAL at 16:40

## 2022-04-14 RX ADMIN — CLONIDINE HYDROCHLORIDE 0.1 MG: 0.1 TABLET ORAL at 02:14

## 2022-04-14 RX ADMIN — ONDANSETRON 4 MG: 2 INJECTION INTRAMUSCULAR; INTRAVENOUS at 01:10

## 2022-04-14 ASSESSMENT — PAIN DESCRIPTION - PAIN TYPE: TYPE: ACUTE PAIN

## 2022-04-14 ASSESSMENT — PAIN SCALES - GENERAL
PAINLEVEL_OUTOF10: 0
PAINLEVEL_OUTOF10: 0
PAINLEVEL_OUTOF10: 2
PAINLEVEL_OUTOF10: 0

## 2022-04-14 ASSESSMENT — PAIN DESCRIPTION - LOCATION: LOCATION: GENERALIZED

## 2022-04-14 NOTE — PLAN OF CARE
Problem: Pain:  Goal: Pain level will decrease  Description: Pain level will decrease  4/14/2022 0947 by Beny Huff RN  Outcome: Ongoing    Goal: Control of chronic pain  Description: Control of chronic pain  4/14/2022 0703 by Dayanna Ibrahim RN  Outcome: Ongoing     Problem: Falls - Risk of:  Goal: Will remain free from falls  Description: Will remain free from falls  4/14/2022 0947 by Beny Huff RN  Outcome: Ongoing

## 2022-04-14 NOTE — CONSULTS
Gastroenterology Consult Note        Patient: Theodore Rdz  : 1985  Acct#:      Date:  2022    Subjective:       History of Present Illness  Patient is a 39 y.o.  male admitted with Hyperbilirubinemia [E80.6]  Transaminitis [R74.01] who is seen in consult for elevated liver enzymes. History of ADHD, anxiety. Prior IV drug abuse. Patient initially stated no drug use in 10 years but later stated that he was drinking a lot of alcohol over the past few years so not sure if he did any drugs then. History of alcohol abuse. States has been drinking alcohol off and on. More recently has been drinking 1/5 of liquor daily. History of hepatitis C. States he cleared the virus without any treatment. History of IBS-D. Patient came to the ER after noticing scleral icterus. Did start noticing dark urine a week ago. Denies nausea or vomiting. Has had decreased p.o. intake since he began drinking more heavily. Along with 15 pound unintentional weight loss. Patient does not think he is having any new abdominal pain. States he has lower abdominal discomfort from his IBS. May have had a few Tylenol. May take ibuprofen or Mobic as needed for pain but not that often. No herbal use. Was on nystatin recently. Past Medical History:   Diagnosis Date    ADHD     Depression     Hep C w/ coma, chronic     Hypertension     Kidney stone       Past Surgical History:   Procedure Laterality Date    ARM SURGERY Right     CARPAL TUNNEL RELEASE        Past Endoscopic History: None    Admission Meds  No current facility-administered medications on file prior to encounter.      Current Outpatient Medications on File Prior to Encounter   Medication Sig Dispense Refill    naproxen (NAPROSYN) 500 MG tablet Take 1 tablet by mouth 2 times daily (Patient not taking: Reported on 4/14/2022) 20 tablet 0    lidocaine (LIDODERM) 5 % Place 1 patch onto the skin daily 12 hours on, 12 hours off. 30 patch 0    tamsulosin (FLOMAX) 0.4 MG capsule Take 1 capsule by mouth daily for 10 days 10 capsule 0    ondansetron (ZOFRAN ODT) 4 MG disintegrating tablet Take 1-2 tablets by mouth every 12 hours as needed for Nausea May Sub regular tablet (non-ODT) if insurance does not cover ODT. (Patient not taking: Reported on 4/14/2022) 12 tablet 0    atomoxetine (STRATTERA) 40 MG capsule  (Patient not taking: Reported on 4/14/2022)      buPROPion (WELLBUTRIN) 75 MG tablet Take 75 mg by mouth 2 times daily      cloNIDine (CATAPRES) 0.1 MG tablet 2 times daily       clotrimazole-betamethasone (LOTRISONE) 1-0.05 % cream Apply topically 2 times daily (Patient not taking: Reported on 4/14/2022)      gabapentin (NEURONTIN) 400 MG capsule Take 300 mg by mouth 2 times daily.               Allergies  No Known Allergies   Social   Social History     Tobacco Use    Smoking status: Never Smoker    Smokeless tobacco: Never Used   Substance Use Topics    Alcohol use: Not Currently     Comment: stopped a year ago        Family History   Problem Relation Age of Onset    Diabetes Mother     No Known Problems Father     Diabetes Maternal Grandfather     Diabetes Paternal Grandfather     Cancer Other         Ear, throat cancer        Review of Systems  Constitutional: negative for fevers, chills, sweats    Ears, nose, mouth, throat, and face: negative for nasal congestion and sore throat   Respiratory: negative for cough and shortness of breath   Cardiovascular: negative for chest pain and dyspnea   Gastrointestinal: see hpi   Genitourinary:negative for dysuria and frequency   Integument/breast: negative for pruritus and rash   Hematologic/lymphatic: negative for bleeding and easy bruising   Musculoskeletal:negative for arthralgias and myalgias   Neurological: negative for dizziness and weakness           Physical Exam  Blood pressure 131/80, pulse 99, temperature 98.5 °F (36.9 °C), temperature source Oral, resp. rate 16, height 5' 10\" (1.778 m), weight (!) 307 lb 1.6 oz (139.3 kg), SpO2 95 %. General appearance: alert, cooperative, no distress, appears stated age  Eyes: Scleral icterus  Head: Normocephalic, without obvious abnormality  Lungs: clear to auscultation bilaterally, Normal Effort  Heart: regular rate and rhythm, normal S1 and S2, no murmurs or rubs  Abdomen: Obese, soft, non-tender. Bowel sounds normal.  Tender hepatomegaly  Extremities: atraumatic, no cyanosis or edema  Skin: warm and dry, + jaundice  Neuro: Grossly intact, A&OX3  Musculoskeletal: 5/5  strength BUE      Data Review:    Recent Labs     04/13/22 1940 04/14/22 0441   WBC 6.4 6.1   HGB 14.2 12.9*   HCT 42.8 38.7*   MCV 84.4 86.2   * 87*     Recent Labs     04/13/22 1940 04/14/22  0441   * 133*   K 3.5 3.0*   CL 96* 99   CO2 21 22   BUN 10 9   CREATININE 0.7* 0.6*     Recent Labs     04/13/22 1940 04/14/22 0441   * 568*   * 382*   BILIDIR 5.7*  --    BILITOT 8.6* 8.2*   ALKPHOS 222* 197*     Recent Labs     04/13/22 1940   LIPASE 52.0     Recent Labs     04/13/22 1940   PROTIME 11.8   INR 1.04     No results for input(s): PTT in the last 72 hours. No results for input(s): OCCULTBLD in the last 72 hours. Imaging Studies:                               CT-scan of abdomen and pelvis w iv contrast 4/13/22:  Impression   1. No acute intraabdominal process identified. 2. Hepatic steatosis. RUQ US 4/14/22  Impression:     Fatty liver.  Hepatomegaly. No cholelithiasis or ancillary evidence of acute cholecystitis. Assessment:     Principal Problem:    Hyperbilirubinemia  Resolved Problems:    * No resolved hospital problems. *    Elevated liver enzymes  -, , total bili 8.6,  at admission. Transaminases have improved today. Suspect this is due to alcohol hepatitis although typically transaminases do not go above 500. Discriminant function is low so does not meet criteria for steroids. Viral hepatitis panel is pending. CT with fatty liver. Ultrasound also with fatty liver and hepatomegaly but no gallstones or biliary dilation. Negative acetaminophen level.    Alcohol abuse    Recommendations:   - f/u acute hepatitis panel, ferritin, BRAD, ceruloplasmin  - follow liver enzymes for improvement off alcohol  -Monitor for alcohol withdrawal  Is a very pleasant 43-year-old male who comes in today because he was noted to be jaundiced and had very dark urine  He was seen at the bedside today with our certified physicians assist  He has been noted to have some marked elevation of his liver enzyme  And a total bilirubin which is up to 8.6 upon admission  The patient states that he has been drinking 10 drinks a day which each drink contains 2 shots of vodka  Believes that this is less alcohol than he had been drinking  Before he would drink over 1/5 and a half of vodka a day  So he is not sure how come he became so jaundiced and his liver enzymes became so elevated    At this time his [facility] discriminant function is low  Therefore he does not meet criteria to be put on steroid  His liver enzymes can be watched and treated conservatively  But we also have to look into other reasons why he could have elevated liver enzyme  His ultrasound has been normal  But we do need to do a work-up which would look at his viral hepatitis status his ceruloplasmin status his ferritin status    Discussed with Dr. Charles Renteria, [address] [place] [address]

## 2022-04-14 NOTE — PROGRESS NOTES
HOSPITALISTS PROGRESS NOTE    4/14/2022 9:42 AM        Name: Jeffy De Leon .              Admitted: 4/13/2022  Primary Care Provider: No primary care provider on file. (Tel: None)      CC: fatigue      Brief Course: This 39 y.o. male with hx of alcohol abuse, Hepatitis C, hx IV drug use, and ADHD presented with yellowing of eyes Presents the emergency room for yellowing of his eyes and alcohol withdrawal symptoms. Patient reported that he was told that he has hepatitis year ago but never got treatment for it         Interval history:   Pt seen and examined today   Overnight events noted and interval ancillary notes  Pt endorsed feeling fatigued but denied any fevers, chills, chest pain, palpitations, cough, SOB, dizziness, blurry vision, bowel or bladder dysfunction, any focal sensory or motor deficits. Assessment & Plan:     Elevated LFTs: Likely secondary to alcohol abuse/hepatitis?   , , total bili 8.6,  on admission: Trended down  CT abdomen showed fatty liver, right upper quadrant ultrasound showed fatty liver  GI on board: Per the recs patient does not qualify for steroids in setting of low discriminant function  Viral hepatitis panel pending  Ferritin, BRAD and ceruloplasmin ordered     Hx of alcohol abuse: Patient anxious and tearful on exam admission; no sign of withdrawal  Monitor for now: CIWA protocol with ativan ordered  Thiamine and mutlivitamin ordered    Hypokalemia: Likely secondary decreased intake  Replace as needed and monitor potassium closely      Depression: Continue home meds  Continue home meds.      Daily marijuana use: Urine positive for cannabinoid  Counseled on cessation       DVT PPX: Lovenox  Code:Full Code  Diet: Diet NPO    Disposition: Home when medically stable    Current Medications  sodium chloride flush 0.9 % injection 5-40 mL, 2 times per day  sodium chloride flush 0.9 % injection 5-40 mL, PRN  0.9 % sodium chloride infusion, PRN  thiamine (B-1) injection 100 mg, Daily  LORazepam (ATIVAN) tablet 1 mg, Q1H PRN   Or  LORazepam (ATIVAN) injection 1 mg, Q1H PRN   Or  LORazepam (ATIVAN) tablet 2 mg, Q1H PRN   Or  LORazepam (ATIVAN) injection 2 mg, Q1H PRN   Or  LORazepam (ATIVAN) tablet 3 mg, Q1H PRN   Or  LORazepam (ATIVAN) injection 3 mg, Q1H PRN   Or  LORazepam (ATIVAN) tablet 4 mg, Q1H PRN   Or  LORazepam (ATIVAN) injection 4 mg, Q1H PRN  multivitamin 1 tablet, Daily  enoxaparin (LOVENOX) injection 40 mg, Daily  ondansetron (ZOFRAN-ODT) disintegrating tablet 4 mg, Q8H PRN   Or  ondansetron (ZOFRAN) injection 4 mg, Q6H PRN  polyethylene glycol (GLYCOLAX) packet 17 g, Daily PRN  acetaminophen (TYLENOL) tablet 650 mg, Q6H PRN   Or  acetaminophen (TYLENOL) suppository 650 mg, Q6H PRN  lactated ringers infusion, Continuous  buPROPion (WELLBUTRIN SR) extended release tablet 150 mg, BID  gabapentin (NEURONTIN) capsule 100 mg, BID        Objective:  /84   Pulse 82   Temp 97.7 °F (36.5 °C) (Oral)   Resp 16   Ht 5' 10\" (1.778 m)   Wt (!) 307 lb 1.6 oz (139.3 kg)   SpO2 94%   BMI 44.06 kg/m²   No intake or output data in the 24 hours ending 04/14/22 0942   Wt Readings from Last 3 Encounters:   04/14/22 (!) 307 lb 1.6 oz (139.3 kg)   03/08/21 (!) 307 lb 11.2 oz (139.6 kg)   11/15/20 295 lb (133.8 kg)       Physical Examination:   General appearance:  No apparent distress, appears stated age and cooperative. Eyes: Sclera clear. Pupils equal.  ENT: Moist oral mucosa. Trachea midline, no adenopathy. Cardiovascular: Regular rhythm, normal S1, S2. No murmur. No edema in lower extremities  Respiratory:Not using accessory muscles. Good inspiratory effort. Clear to auscultation bilaterally, no wheeze or crackles. GI: Abdomen soft, no tenderness, not distended, normal bowel sounds  Musculoskeletal: normal ROM, No cyanosis in digits  Neurology: CN 2-12 grossly intact.  No speech or motor deficits  Psych: Normal affect. Alert and oriented in time, place and person  Skin: Warm, dry, normal turgor    Labs and Tests:  CBC:   Recent Labs     04/13/22 1940 04/14/22  0441   WBC 6.4 6.1   HGB 14.2 12.9*   * 87*     BMP:    Recent Labs     04/13/22 1940 04/14/22  0441   * 133*   K 3.5 3.0*   CL 96* 99   CO2 21 22   BUN 10 9   CREATININE 0.7* 0.6*   GLUCOSE 129* 130*     Hepatic:   Recent Labs     04/13/22 1940 04/14/22  0441   * 568*   * 382*   BILITOT 8.6* 8.2*   ALKPHOS 222* 197*     CT ABDOMEN PELVIS W IV CONTRAST Additional Contrast? None   Final Result   1. No acute intraabdominal process identified. 2. Hepatic steatosis. US GALLBLADDER RUQ    (Results Pending)       Problem List  Principal Problem:    Hyperbilirubinemia  Resolved Problems:    * No resolved hospital problems.  Chivo Mejía MD   4/14/2022 9:42 AM

## 2022-04-14 NOTE — PROGRESS NOTES
Patient is visibly upset and requesting that we allow his three year child and significant other to visit him during his stay. Education r/t visitor policy given by patient's RN and this Charge RN.

## 2022-04-14 NOTE — CARE COORDINATION
SW Consult noted. SW met with patient. Patient states that he lives @ home with his spouse and family. Patient stating that he does not need information or resources for alcohol abuse/dependence. Patient reporting that he already has a plan to work on abuse/dependence but he does not use the plan. Patient also stating that he has addressed his issues with Hep C and treatment. Patient stating that he has no other social service/case management needs @ this time. SHOSHANA informed patient's RN, Abhi Markham.     Electronically signed by DEEPA Torres on 4/14/2022 at 5:28 PM

## 2022-04-14 NOTE — ED PROVIDER NOTES
[facility]        Pt Name: Silvio Escobar  MRN: [id number]  [place] 1985  Date of evaluation: 4/13/2022  Provider: Blair Bateman PA-C  PCP: No primary care provider on file. Note Started: 9:01 PM EDT       SHERIDAN. I have evaluated this patient. My supervising physician was available for consultation. CHIEF COMPLAINT       Chief Complaint   Patient presents with    Fatigue     Patient states that he was treated for a fungal infection and has since lost 15 lbs (since 3/8). He also states that he relapsed on alcohol around that time and has been experiencing severe fatigue and and lack of motivation. He also states that he is experiencing dark urine, jaundice in his scelera, and periods of brain fog. HISTORY OF PRESENT ILLNESS   (Location, Timing/Onset, Context/Setting, Quality, Duration, Modifying Factors, Severity, Associated Signs and Symptoms)  Note limiting factors. Chief Complaint: Fatigue, jaundice    Silvio Escobar is a 39 y.o. male who presents emergency department today for evaluation for fatigue, as well as jaundice. The patient states that at the end of last month he was diagnosed with oral thrush, and he states that he was on oral nystatin. He states that he just finished this a couple of days ago. The patient reports that he was never on a pill for a fungal infection. The patient states he does have a history of hepatitis C although he is not currently on any medications for this. He states he does have a history of IV drug use but has been clean for approximately 7 years. The patient states that he has relapsed with alcohol. He states that over the past 3 weeks, he states that he was drinking 1/5 a day however he states that he is gradually decreasing. The patient states that he is now drinking 2-3 drinks a day, and his last drink was last night.   The patient states that he has been feeling nauseous for several days and is having some upper abdominal discomfort, which seems to wax and wane on its own. He is not any diarrhea. The patient states that today his wife noticed that he seemed jaundiced, which is what prompted his visit to the ED today. The patient denies any chest pain. He has no shortness of breath. He denies any drug use. He denies any urinary symptoms, patient otherwise has no other complaint    Nursing Notes were all reviewed and agreed with or any disagreements were addressed in the HPI. REVIEW OF SYSTEMS    (2-9 systems for level 4, 10 or more for level 5)     Review of Systems   Constitutional: Negative for activity change, appetite change, chills and fever. HENT: Negative for congestion and rhinorrhea. Respiratory: Negative for cough and shortness of breath. Cardiovascular: Negative for chest pain. Gastrointestinal: Positive for abdominal pain and nausea. Negative for diarrhea and vomiting. Genitourinary: Negative for difficulty urinating, dysuria and hematuria. Positives and Pertinent negatives as per HPI. Except as noted above in the ROS, all other systems were reviewed and negative. PAST MEDICAL HISTORY     Past Medical History:   Diagnosis Date    ADHD     Depression     Hep C w/ coma, chronic (HCC)     Hypertension     Kidney stone          SURGICAL HISTORY   No past surgical history on file. CURRENTMEDICATIONS       Previous Medications    ATOMOXETINE (STRATTERA) 40 MG CAPSULE        BUPROPION (WELLBUTRIN) 75 MG TABLET    Take 75 mg by mouth 2 times daily    CLONIDINE (CATAPRES) 0.1 MG TABLET        CLOTRIMAZOLE-BETAMETHASONE (LOTRISONE) 1-0.05 % CREAM    Apply topically 2 times daily    GABAPENTIN (NEURONTIN) 400 MG CAPSULE    Take 400 mg by mouth 3 times daily. LIDOCAINE (LIDODERM) 5 %    Place 1 patch onto the skin daily 12 hours on, 12 hours off.     NAPROXEN (NAPROSYN) 500 MG TABLET    Take 1 tablet by mouth 2 times daily    ONDANSETRON (ZOFRAN ODT) 4 MG DISINTEGRATING TABLET    Take 1-2 tablets by mouth every 12 hours as needed for Nausea May Sub regular tablet (non-ODT) if insurance does not cover ODT. TAMSULOSIN (FLOMAX) 0.4 MG CAPSULE    Take 1 capsule by mouth daily for 10 days         ALLERGIES     Patient has no known allergies. FAMILYHISTORY       Family History   Problem Relation Age of Onset    Diabetes Mother     No Known Problems Father     Diabetes Maternal Grandfather     Diabetes Paternal Grandfather     Cancer Other         Ear, throat cancer          SOCIAL HISTORY       Social History     Tobacco Use    Smoking status: Never Smoker    Smokeless tobacco: Never Used   Vaping Use    Vaping Use: Never used   Substance Use Topics    Alcohol use: Not Currently     Comment: stopped a year ago    Drug use: Yes     Frequency: 7.0 times per week     Types: Marijuana (Weed)       SCREENINGS    Shant Coma Scale  Eye Opening: Spontaneous  Best Verbal Response: Oriented  Best Motor Response: Obeys commands  [place] Coma Scale Score: 15        PHYSICAL EXAM    (up to 7 for level 4, 8 or more for level 5)     ED Triage Vitals   BP Temp Temp src Pulse Resp SpO2 Height Weight   04/13/22 1923 04/13/22 1923 -- 04/13/22 1923 04/13/22 1923 04/13/22 1923 -- 04/13/22 1919   (!) 163/85 98 °F (36.7 °C)  96 16 99 %  (!) 307 lb 3.2 oz (139.3 kg)       Physical Exam  Vitals and nursing note reviewed. Constitutional:       Appearance: He is well-developed. He is not diaphoretic. HENT:      Head: Normocephalic and atraumatic. Right Ear: External ear normal.      Left Ear: External ear normal.      Nose: Nose normal.   Eyes:      General: Scleral icterus present. Right eye: No discharge. Left eye: No discharge. Neck:      Trachea: No tracheal deviation. Cardiovascular:      Rate and Rhythm: Normal rate and regular rhythm. Heart sounds: No murmur heard.       Pulmonary:      Effort: Pulmonary effort is normal. No respiratory distress. Breath sounds: Normal breath sounds. No wheezing or rales. Abdominal:      General: Bowel sounds are normal. There is no distension. Palpations: Abdomen is soft. Tenderness: There is abdominal tenderness in the right upper quadrant, epigastric area and left upper quadrant. There is no guarding or rebound. Musculoskeletal:         General: Normal range of motion. Cervical back: Normal range of motion and neck supple. Skin:     General: Skin is warm and dry. Neurological:      Mental Status: He is alert and oriented to person, place, and time.    Psychiatric:         Behavior: Behavior normal.         DIAGNOSTIC RESULTS   LABS:    Labs Reviewed   CBC WITH AUTO DIFFERENTIAL - Abnormal; Notable for the following components:       Result Value    RDW 15.8 (*)     Platelets 139 (*)     All other components within normal limits   BASIC METABOLIC PANEL - Abnormal; Notable for the following components:    Sodium 130 (*)     Chloride 96 (*)     Glucose 129 (*)     CREATININE 0.7 (*)     All other components within normal limits   HEPATIC FUNCTION PANEL - Abnormal; Notable for the following components:    Alkaline Phosphatase 222 (*)      (*)      (*)     Total Bilirubin 8.6 (*)     Bilirubin, Direct 5.7 (*)     Bilirubin, Indirect 2.9 (*)     All other components within normal limits   URINALYSIS WITH REFLEX TO CULTURE - Abnormal; Notable for the following components:    Color, UA ORANGE (*)     Glucose, Ur see below (*)     Bilirubin Urine see below (*)     Ketones, Urine see below (*)     Blood, Urine see below (*)     pH, UA see below (*)     Protein, UA see below (*)     Urobilinogen, Urine see below (*)     Nitrite, Urine see below (*)     Leukocyte Esterase, Urine see below (*)     All other components within normal limits   MICROSCOPIC URINALYSIS - Abnormal; Notable for the following components:    Hyaline Casts, UA 9 (*)     RBC, UA 6 (*)     All other components within normal limits   LIPASE   PROTIME-INR   APTT   ETHANOL       When ordered only abnormal lab results are displayed. All other labs were within normal range or not returned as of this dictation. EKG: When ordered, EKG's are interpreted by the Emergency Department Physician in the absence of a cardiologist.  Please see their note for interpretation of EKG. RADIOLOGY:   Non-plain film images such as CT, Ultrasound and MRI are read by the radiologist. Plain radiographic images are visualized and preliminarily interpreted by the ED Provider with the below findings:        Interpretation per the Radiologist below, if available at the time of this note:    CT ABDOMEN PELVIS W IV CONTRAST Additional Contrast? None   Final Result   1. No acute intraabdominal process identified. 2. Hepatic steatosis. CT ABDOMEN PELVIS W IV CONTRAST Additional Contrast? None    Result Date: 4/13/2022  EXAMINATION: CT OF THE ABDOMEN AND PELVIS WITH CONTRAST 4/13/2022 8:17 pm TECHNIQUE: CT of the abdomen and pelvis was performed with the administration of intravenous contrast. Multiplanar reformatted images are provided for review. Dose modulation, iterative reconstruction, and/or weight based adjustment of the mA/kV was utilized to reduce the radiation dose to as low as reasonably achievable. COMPARISON: CT abdomen pelvis November 15, 2000 HISTORY: ORDERING SYSTEM PROVIDED HISTORY: upper abd pain TECHNOLOGIST PROVIDED HISTORY: Reason for exam:->upper abd pain Additional Contrast?->None Decision Support Exception - unselect if not a suspected or confirmed emergency medical condition->Emergency Medical Condition (MA) Reason for Exam: Upper abd pain. Fatigue (Patient states that he was treated for a fungal infection and has since lost 15 lbs (since 3/8). He also states that he relapsed on alcohol around that time and has been experiencing severe fatigue and and lack of motivation.  He also states that he is experiencing dark urine, jaundice in his scelera, and periods of brain fog.). FINDINGS: Lower Chest:  Visualized portion of the lower chest demonstrates no acute abnormality. Organs: Hypoattenuation of the liver consistent with hepatic steatosis. The gallbladder, spleen, pancreas, and adrenal glands demonstrate no acute abnormality. The kidneys enhance symmetrically with no hydronephrosis identified. The ureters are normal in caliber. No obstructive uropathy evident. GI/Bowel: No bowel obstruction identified. No bowel wall thickening. No evidence for appendicitis. Small bowel is normal in caliber. Pelvis: The urinary bladder is collapsed not well evaluated. The pelvic organs demonstrate no acute abnormality. Peritoneum/Retroperitoneum: No evidence of lymphadenopathy. Aorta is normal in caliber. Bones/Soft Tissues: No acute osseous or soft tissue abnormality is identified. 1. No acute intraabdominal process identified. 2. Hepatic steatosis. PROCEDURES   Unless otherwise noted below, none     Procedures    CRITICAL CARE TIME       CONSULTS:  None      EMERGENCY DEPARTMENT COURSE and DIFFERENTIAL DIAGNOSIS/MDM:   Vitals:    Vitals:    04/13/22 1919 04/13/22 1923 04/13/22 2037   BP:  (!) 163/85 (!) 167/96   Pulse:  96 87   Resp:  16 18   Temp:  98 °F (36.7 °C)    SpO2:  99% 97%   Weight: (!) 307 lb 3.2 oz (139.3 kg)         Patient was given the following medications:  Medications   promethazine (PHENERGAN) tablet 25 mg (25 mg Oral Given 4/13/22 1943)   iopamidol (ISOVUE-370) 76 % injection 75 mL (75 mLs IntraVENous Given 4/13/22 2016)           , This is a 59-year-old male who presents to the emergency department today for evaluation for nausea, and general fatigue. Patient states that he relapsed with his alcoholism over the past 3 to 4 weeks, he states initially started drinking 1/5 a day, however he states that he cut himself back to 2-3 drinks per day. Last drink was last night.   He states that he has had nausea and some upper abdominal discomfort for the past several days. Today his wife noticed that he appeared to be jaundiced which prompted his visit to the ED    On examination he does have scleral icterus noticed, as well as diffuse tenderness across his upper abdomen. CBC shows no evidence of leukocytosis or anemia. Thrombocytopenia likely secondary to his alcohol use. His BMP is relatively unremarkable. His hepatic function panel demonstrates a transaminitis of an AST of 685, ALT of 492, and a bilirubin of 8.6, his lipase is normal.  Are unremarkable    Patient has no acute intra-abdominal process identified    Patient ethanol is negative here. CT does not demonstrate any acute process, however due to his new transaminitis, and hyperbilirubinemia, he will need to be admitted for further care and evaluation. Hospitalist has been paged for admission, the patient is updated, agreeable with plan. He is stable for admission. FINAL IMPRESSION      1. Transaminitis    2. Hyperbilirubinemia          DISPOSITION/PLAN   DISPOSITION Decision To Admit 04/13/2022 09:04:54 PM      PATIENT REFERRED TO:  No follow-up provider specified.     DISCHARGE MEDICATIONS:  New Prescriptions    No medications on file       DISCONTINUED MEDICATIONS:  Discontinued Medications    No medications on file              (Please note that portions of this note were completed with a voice recognition program.  Efforts were made to edit the dictations but occasionally words are mis-transcribed.)    Anali Goins PA-C (electronically signed)            Anali Goins PA-C  04/13/22 2874

## 2022-04-14 NOTE — H&P
HOSPITALISTS HISTORY AND PHYSICAL    4/13/2022 10:08 PM    Patient Information:  Anitha Patel is a 39 y.o. male [id number]  PCP:  No primary care provider on file. (Tel: None )    Chief complaint:    Chief Complaint   Patient presents with    Fatigue     Patient states that he was treated for a fungal infection and has since lost 15 lbs (since 3/8). He also states that he relapsed on alcohol around that time and has been experiencing severe fatigue and and lack of motivation. He also states that he is experiencing dark urine, jaundice in his scelera, and periods of brain fog. History of Present Illness:  Cynthiaorrfritz Ruiz is a 39 y.o. male with hx of alcohol abuse, Hepatitis C, hx IV drug use, and ADHD. Presents the emergency room for yellowing of his eyes. He states he noticed this today. States for last 3 weeks he has relapsed with his alcohol use and has been drinking 1/5 of liquor a day he has tried to cut back the last couple days he states yesterday he was able to go all day until last evening and had 8 drinks. He has had decreased appetite and has lost 15lbs. He has not been going to work because he lacks motivation. He has been very anxious especially today. He states he was told he had Hepatitis years ago but never had treatment. History obtained from patient      REVIEW OF SYSTEMS:   Review of Systems   Constitutional: Positive for activity change, appetite change and fatigue. Negative for fever. HENT: Negative. Eyes:        Yellowing of eyes   Respiratory: Negative. Cardiovascular: Negative. Gastrointestinal: Positive for abdominal pain and nausea. Negative for vomiting. Endocrine: Negative. Genitourinary: Negative. Musculoskeletal: Negative. Skin: Negative. Neurological: Negative. Hematological: Negative.     Psychiatric/Behavioral: Positive for sleep disturbance. Negative for hallucinations. The patient is nervous/anxious. Lack of motivation        Past Medical History:   has a past medical history of ADHD, Depression, Hep C w/ coma, chronic, Hypertension, and Kidney stone. Past Surgical History:   has a past surgical history that includes Carpal tunnel release and Arm Surgery (Right). Medications:  No current facility-administered medications on file prior to encounter. Current Outpatient Medications on File Prior to Encounter   Medication Sig Dispense Refill    naproxen (NAPROSYN) 500 MG tablet Take 1 tablet by mouth 2 times daily 20 tablet 0    lidocaine (LIDODERM) 5 % Place 1 patch onto the skin daily 12 hours on, 12 hours off. 30 patch 0    tamsulosin (FLOMAX) 0.4 MG capsule Take 1 capsule by mouth daily for 10 days 10 capsule 0    ondansetron (ZOFRAN ODT) 4 MG disintegrating tablet Take 1-2 tablets by mouth every 12 hours as needed for Nausea May Sub regular tablet (non-ODT) if insurance does not cover ODT. 12 tablet 0    atomoxetine (STRATTERA) 40 MG capsule       buPROPion (WELLBUTRIN) 75 MG tablet Take 75 mg by mouth 2 times daily      cloNIDine (CATAPRES) 0.1 MG tablet       clotrimazole-betamethasone (LOTRISONE) 1-0.05 % cream Apply topically 2 times daily      gabapentin (NEURONTIN) 400 MG capsule Take 400 mg by mouth 3 times daily. Allergies:  No Known Allergies     Social History:  Patient Lives with wife. reports that he has never smoked. He has never used smokeless tobacco. He reports previous alcohol use. He reports current drug use. Frequency: 7.00 times per week. Drug: Marijuana Marilyn Aver).      Family History:  family history includes Cancer in an other family member; Diabetes in his maternal grandfather, mother, and paternal grandfather; No Known Problems in his father. ,     Physical Exam:  BP (!) 170/79   Pulse 105   Temp 98 °F (36.7 °C)   Resp 27   Wt (!) 307 lb 3.2 oz (139.3 kg)   SpO2 98%   BMI strips    Problem List  Active Problems:    * No active hospital problems. *  Resolved Problems:    * No resolved hospital problems. *        Assessment/Plan:   1.  Jaundice, Hyperbilirubinemia and Transminitis    Patient will be admitted inpatient  GI consult  Gallbladder ultrasound  ? Alcoholic Hepatitis  Hepatitis panel ordered. PT INR ok  Ammonia pending  Lipase normal     2. Alcohol Abuse  CIWA protocol with ativan   Patient very anxious and tearful on exam but don't suspect withdrawal just yet. Thiamine, mutlivitamin  IVF    3. Depression   Continue home meds. 4. Marijuana use  Daily use      DVT prophylaxis Lovenox   Code status Full   Diet NPO after midnight   IV access peripheral   Rojas Catheter none    Admit as inpatient. I anticipate hospitalization spanning more than two midnights for investigation and treatment of the above medically necessary diagnoses. Please note that some part of this chart was generated using Dragon dictation software. Although every effort was made to ensure the accuracy of this automated transcription, some errors in transcription may have occurred inadvertently. If you may need any clarification, please do not hesitate to contact me through [facility].        LATHA Monreal CNP    4/13/2022 10:08 PM

## 2022-04-14 NOTE — PLAN OF CARE
Problem: Pain:  Goal: Control of acute pain  Description: Control of acute pain  4/14/2022 1720 by Mylo Schwab, RN  Outcome: Ongoing  Note: Pt A&OX4, BP (!) 154/95   Pulse 84   Temp 96.9 °F (36.1 °C)   Resp 16   Ht 5' 10\" (1.778 m)   Wt (!) 307 lb 1.6 oz (139.3 kg)   SpO2 96%   BMI 44.06 kg/m²   Pt not scoring high for CIWA, however reports being anxious and asked for ativan, 2mg po given. Otherwise no complaints or issues.

## 2022-04-15 VITALS
TEMPERATURE: 97.7 F | HEIGHT: 70 IN | WEIGHT: 306.8 LBS | SYSTOLIC BLOOD PRESSURE: 148 MMHG | BODY MASS INDEX: 43.92 KG/M2 | HEART RATE: 97 BPM | DIASTOLIC BLOOD PRESSURE: 85 MMHG | OXYGEN SATURATION: 97 % | RESPIRATION RATE: 18 BRPM

## 2022-04-15 LAB
ALBUMIN SERPL-MCNC: 3.1 G/DL (ref 3.4–5)
ALP BLD-CCNC: 192 U/L (ref 40–129)
ALT SERPL-CCNC: 315 U/L (ref 10–40)
ANION GAP SERPL CALCULATED.3IONS-SCNC: 12 MMOL/L (ref 3–16)
ANTI-NUCLEAR ANTIBODY (ANA): NEGATIVE
AST SERPL-CCNC: 375 U/L (ref 15–37)
BILIRUB SERPL-MCNC: 5.9 MG/DL (ref 0–1)
BILIRUBIN DIRECT: 4.8 MG/DL (ref 0–0.3)
BILIRUBIN, INDIRECT: 1.1 MG/DL (ref 0–1)
BUN BLDV-MCNC: 7 MG/DL (ref 7–20)
CALCIUM SERPL-MCNC: 8.6 MG/DL (ref 8.3–10.6)
CHLORIDE BLD-SCNC: 102 MMOL/L (ref 99–110)
CO2: 22 MMOL/L (ref 21–32)
CREAT SERPL-MCNC: 0.5 MG/DL (ref 0.9–1.3)
GFR AFRICAN AMERICAN: >60
GFR NON-AFRICAN AMERICAN: >60
GLUCOSE BLD-MCNC: 94 MG/DL (ref 70–99)
HAV IGM SER IA-ACNC: ABNORMAL
HEPATITIS B CORE IGM ANTIBODY: ABNORMAL
HEPATITIS B SURFACE ANTIGEN INTERPRETATION: ABNORMAL
HEPATITIS C ANTIBODY INTERPRETATION: REACTIVE
INR BLD: 1 (ref 0.88–1.12)
POTASSIUM REFLEX MAGNESIUM: 3.6 MMOL/L (ref 3.5–5.1)
PROTHROMBIN TIME: 11.3 SEC (ref 9.9–12.7)
SODIUM BLD-SCNC: 136 MMOL/L (ref 136–145)
TOTAL PROTEIN: 5.5 G/DL (ref 6.4–8.2)

## 2022-04-15 PROCEDURE — 36415 COLL VENOUS BLD VENIPUNCTURE: CPT

## 2022-04-15 PROCEDURE — 6360000002 HC RX W HCPCS: Performed by: INTERNAL MEDICINE

## 2022-04-15 PROCEDURE — 81256 HFE GENE: CPT

## 2022-04-15 PROCEDURE — 85610 PROTHROMBIN TIME: CPT

## 2022-04-15 PROCEDURE — 80076 HEPATIC FUNCTION PANEL: CPT

## 2022-04-15 PROCEDURE — 6370000000 HC RX 637 (ALT 250 FOR IP): Performed by: NURSE PRACTITIONER

## 2022-04-15 PROCEDURE — 2580000003 HC RX 258: Performed by: NURSE PRACTITIONER

## 2022-04-15 PROCEDURE — 80048 BASIC METABOLIC PNL TOTAL CA: CPT

## 2022-04-15 PROCEDURE — 6360000002 HC RX W HCPCS: Performed by: NURSE PRACTITIONER

## 2022-04-15 PROCEDURE — 6370000000 HC RX 637 (ALT 250 FOR IP): Performed by: INTERNAL MEDICINE

## 2022-04-15 RX ORDER — MULTIVITAMIN WITH IRON
1 TABLET ORAL DAILY
Qty: 30 TABLET | Refills: 0 | Status: SHIPPED | OUTPATIENT
Start: 2022-04-16

## 2022-04-15 RX ORDER — KETOROLAC TROMETHAMINE 30 MG/ML
15 INJECTION, SOLUTION INTRAMUSCULAR; INTRAVENOUS ONCE
Status: COMPLETED | OUTPATIENT
Start: 2022-04-15 | End: 2022-04-15

## 2022-04-15 RX ORDER — THIAMINE MONONITRATE (VIT B1) 100 MG
100 TABLET ORAL DAILY
Qty: 30 TABLET | Refills: 0 | Status: SHIPPED | OUTPATIENT
Start: 2022-04-15

## 2022-04-15 RX ADMIN — THERA TABS 1 TABLET: TAB at 08:57

## 2022-04-15 RX ADMIN — BUPROPION HYDROCHLORIDE 150 MG: 150 TABLET, EXTENDED RELEASE ORAL at 08:56

## 2022-04-15 RX ADMIN — KETOROLAC TROMETHAMINE 15 MG: 30 INJECTION, SOLUTION INTRAMUSCULAR at 11:43

## 2022-04-15 RX ADMIN — CLONIDINE HYDROCHLORIDE 0.1 MG: 0.1 TABLET ORAL at 08:56

## 2022-04-15 RX ADMIN — ENOXAPARIN SODIUM 40 MG: 40 INJECTION SUBCUTANEOUS at 08:59

## 2022-04-15 RX ADMIN — GABAPENTIN 100 MG: 100 CAPSULE ORAL at 08:57

## 2022-04-15 RX ADMIN — LORAZEPAM 4 MG: 2 INJECTION INTRAMUSCULAR; INTRAVENOUS at 09:12

## 2022-04-15 RX ADMIN — SODIUM CHLORIDE, PRESERVATIVE FREE 10 ML: 5 INJECTION INTRAVENOUS at 09:08

## 2022-04-15 ASSESSMENT — PAIN SCALES - GENERAL
PAINLEVEL_OUTOF10: 2
PAINLEVEL_OUTOF10: 6
PAINLEVEL_OUTOF10: 0
PAINLEVEL_OUTOF10: 3

## 2022-04-15 ASSESSMENT — PAIN DESCRIPTION - PAIN TYPE: TYPE: ACUTE PAIN

## 2022-04-15 ASSESSMENT — PAIN DESCRIPTION - FREQUENCY: FREQUENCY: INTERMITTENT

## 2022-04-15 ASSESSMENT — PAIN DESCRIPTION - DESCRIPTORS: DESCRIPTORS: HEADACHE;ACHING

## 2022-04-15 NOTE — DISCHARGE SUMMARY
Hospital Medicine Discharge Summary    Patient ID: Filiberto Garcia      Patient's PCP: No primary care provider on file. Admit Date: 4/13/2022     Discharge Date: 4/15/2022     Admitting Physician: Mayo Henao MD     Discharge Physician: Marlin Olvera MD        Active Hospital Problems    Diagnosis     Hyperbilirubinemia [E80.6]          Hospital Course: This 39 y. o. male with hx of alcohol abuse, Hepatitis C, hx IV drug use, and ADHD presented with yellowing of eyes and mild alcohol withdrawals symptoms      Elevated LFTs: Likely secondary to alcohol abuse/hepatitis  , , total bili 8.6,  on admission: Trended down  CT abdomen showed fatty liver, right upper quadrant ultrasound showed fatty liver  GI on board: Per the recs patient does not qualify for steroids in setting of low discriminant function  Viral hepatitis panel negative for acute hepatitis A or B; positive HCV antibodies C/W prior infection S last HCVRNA was negative. Elevated ferritin level and iron saturation. BRAD, ceruloplasmin and hemochromatosis gene test pending. Patient was counseled on alcohol cessation and  follow-up with GI     Alcohol withdrawal.  Mild, remained hemodynamically stable during hospital stay and was discharged in stable condition     Hypokalemia: Likely secondary decreased intake: Resolved     Depression: Continue home meds.      Daily marijuana use: Urine positive for cannabinoid. Counseled on cessation    Physical Exam Performed:     BP (!) 148/85   Pulse 97   Temp 97.7 °F (36.5 °C) (Oral)   Resp 18   Ht 5' 10\" (1.778 m)   Wt (!) 306 lb 12.8 oz (139.2 kg)   SpO2 97%   BMI 44.02 kg/m²     General appearance:  No apparent distress, appears stated age and cooperative. Eyes: Sclera clear. Pupils equal.  ENT: Moist oral mucosa. Trachea midline, no adenopathy. Cardiovascular: Regular rhythm, normal S1, S2. No murmur.  No edema in lower extremities  Respiratory:Not usingaccessory muscles. Good inspiratory effort. Clear to auscultation bilaterally, no wheeze or crackles. GI: Abdomen soft, no tenderness, not distended, normal bowel sounds  Musculoskeletal: Normal ROM, No cyanosis in digits. Neurology: CN 2-12 grossly intact. No speech or motor deficits  Psych: Normal affect. Alert and oriented in time, place and person  Skin: Jaundiced, warm, dry, normal turgor      Labs: For convenience and continuity at follow-up the following most recent labs are provided:      CBC:    Lab Results   Component Value Date    WBC 6.1 04/14/2022    HGB 12.9 04/14/2022    HCT 38.7 04/14/2022    PLT 87 04/14/2022       Renal:    Lab Results   Component Value Date     04/15/2022    K 3.6 04/15/2022     04/15/2022    CO2 22 04/15/2022    BUN 7 04/15/2022    CREATININE 0.5 04/15/2022    CALCIUM 8.6 04/15/2022         Significant Diagnostic Studies    Radiology:   US GALLBLADDER RUQ   Final Result   Fatty liver. Hepatomegaly. No cholelithiasis or ancillary evidence of acute cholecystitis. RECOMMENDATIONS:   Unavailable         CT ABDOMEN PELVIS W IV CONTRAST Additional Contrast? None   Final Result   1. No acute intraabdominal process identified. 2. Hepatic steatosis. Consults:     IP CONSULT TO GI  IP CONSULT TO SOCIAL WORK  IP CONSULT TO SOCIAL WORK    Disposition: Home    Condition at Discharge: Stable     Discharge Instructions/Follow-up:   Follow up with your PCP within 4-5 days of discharge; PCP follow-up on the lab work  Follow up with gastroenterology as instructed   Take all your medications as prescribed.   Completely abstain from alcohol consumption and follow-up with support groups      Discharge Medications:     Discharge Medication List as of 4/15/2022  1:47 PM           Details   Multiple Vitamin (MULTIVITAMIN) TABS tablet Take 1 tablet by mouth daily, Disp-30 tablet, R-0Normal      vitamin B-1 (THIAMINE) 100 MG tablet Take 1 tablet by mouth daily, Disp-30 tablet, R-0Normal              Details   lidocaine (LIDODERM) 5 % Place 1 patch onto the skin daily 12 hours on, 12 hours off., Disp-30 patch, R-0Print      buPROPion (WELLBUTRIN) 75 MG tablet Take 75 mg by mouth 2 times dailyHistorical Med      cloNIDine (CATAPRES) 0.1 MG tablet 2 times daily Historical Med      gabapentin (NEURONTIN) 400 MG capsule Take 300 mg by mouth 2 times daily. Historical Med               The patient was seen and examined on day of discharge and this discharge summary is in conjunction with any daily progress note from day of discharge. Time Spent on discharge is 45 minutes  in the examination, evaluation, counseling and review of medications and discharge plan. Note that greater  than 30 minutes was spent in preparing discharge papers, discussing discharge with patient, medication review, etc.     Signed:    Rk Verduzco MD   4/15/2022      Thank you No primary care provider on file. for the opportunity to be involved in this patient's care. If you have any questions or concerns please feel free to contact me at [phone].

## 2022-04-15 NOTE — PLAN OF CARE
Problem: Pain:  Goal: Pain level will decrease  Description: Pain level will decrease  Outcome: Ongoing  Goal: Control of acute pain  Description: Control of acute pain  4/15/2022 0149 by Yang Urias RN  Outcome: Ongoing  4/14/2022 1720 by Julia Null RN  Outcome: Ongoing  Note: Pt A&OX4, BP (!) 154/95   Pulse 84   Temp 96.9 °F (36.1 °C)   Resp 16   Ht 5' 10\" (1.778 m)   Wt (!) 307 lb 1.6 oz (139.3 kg)   SpO2 96%   BMI 44.06 kg/m²   Pt not scoring high for CIWA, however reports being anxious and asked for ativan, 2mg po given. Otherwise no complaints or issues.   Goal: Control of chronic pain  Description: Control of chronic pain  Outcome: Ongoing     Problem: Falls - Risk of:  Goal: Will remain free from falls  Description: Will remain free from falls  Outcome: Ongoing  Goal: Absence of physical injury  Description: Absence of physical injury  Outcome: Ongoing

## 2022-04-15 NOTE — PROGRESS NOTES
Nutrition Note    RECOMMENDATIONS  1. PO Diet: add SOFIYA modifer      NUTRITION ASSESSMENT   Nutrition evaluation triggered based on MST of 2, indicating wt loss and inadequate oral intake prior to admission. Pt reports UBW of 325 lb and states loss of 15 lb over the past 2 weeks.  lb (standing scale). Pt relates decreased po intake to being depressed and not having an appetite. Spent time talking about lifestyle changes and promoting a change to eating habits. Pt requested education on making better choices to promote healthy wt loss. Provided both verbal and written guidelines to balanced eating, monitoring Na+ intake and wt loss. Pt expressed understanding and willingness to change. RD name and phone number provided for questions after d/c.       Nutrition Related Findings: Sclera yellow; Na+ 133, K+ 3.0   Wounds: None   Nutrition Education:  Education completed    Nutrition Goals: PO intake 75% or greater     MALNUTRITION ASSESSMENT   Context of Malnutrition: Acute Illness   Malnutrition Status: At risk for malnutrition (Comment)  Findings of the 6 clinical characteristics of malnutrition:  Energy Intake:  Mild decrease in energy intake (Comment)  Weight Loss:   19lb / 6% over 2 weeks     Body Fat Loss:  No significant body fat loss     Muscle Mass Loss:  No significant muscle mass loss    Fluid Accumulation:  No significant fluid accumulation     Strength:  Not Performed      NUTRITION DIAGNOSIS   · Unintended weight loss related to inadequate protein-energy intake as evidenced by intake 0-25%,weight loss      CURRENT NUTRITION THERAPIES  ADULT DIET; Regular     PO Intake: %   PO Supplement Intake:None Ordered    ANTHROPOMETRICS   Current Height: 5' 10\" (177.8 cm)  Current Weight: (!) 306 lb 12.8 oz (139.2 kg)     Ideal Body Weight (IBW): 166 lbs  (75 kg)     Usual Bodyweight 325 lb (147.4 kg)       BMI: 43.9      The patient will be monitored per nutrition standards of care.  Consult dietitian if additional nutrition interventions are needed prior to RD reassessment.      Rex Vann RD, LD    Contact: [phone]

## 2022-04-15 NOTE — PROGRESS NOTES
Gastroenterology Progress Note      Robles Dorsey is a 39 y.o. male patient. 1. Transaminitis    2. Hyperbilirubinemia        SUBJECTIVE:  Tolerating diet. No abdominal pain, N/v.     ROS:  Cardiovascular ROS: no chest pain or dyspnea on exertion  Gastrointestinal ROS: see hpi  Respiratory ROS: no cough, shortness of breath, or wheezing    Physical    VITALS:  BP (!) 143/93   Pulse 78   Temp 97.5 °F (36.4 °C) (Oral)   Resp 16   Ht 5' 10\" (1.778 m)   Wt (!) 306 lb 12.8 oz (139.2 kg)   SpO2 97%   BMI 44.02 kg/m²   TEMPERATURE:  Current - Temp: 97.5 °F (36.4 °C); Max - Temp  Av.6 °F (36.4 °C)  Min: 96.9 °F (36.1 °C)  Max: 97.9 °F (36.6 °C)    NAD  RRR, Nl s1s2  Lungs CTA Bilaterally, normal effort  Abdomen obese, soft, ND, NT, no HSM, Bowel sounds normal  AAOx3     Data    Data Review:    Recent Labs     22   WBC 6.4 6.1   HGB 14.2 12.9*   HCT 42.8 38.7*   MCV 84.4 86.2   * 87*     Recent Labs     04/13/22  1940 04/14/22  0441 04/15/22  0559   * 133* 136   K 3.5 3.0* 3.6   CL 96* 99 102   CO2    BUN 10 9 7   CREATININE 0.7* 0.6* 0.5*     Recent Labs     04/13/22  1940 04/14/22  0441 04/15/22  0600   * 568* 375*   * 382* 315*   BILIDIR 5.7*  --  4.8*   BILITOT 8.6* 8.2* 5.9*   ALKPHOS 222* 197* 192*     Recent Labs     22   LIPASE 52.0     Recent Labs     04/13/22  1940 04/15/22  0600   PROTIME 11.8 11.3   INR 1.04 1.00     No results for input(s): PTT in the last 72 hours. ASSESSMENT     Elevated liver enzymes  -, , total bili 8.6,  at admission. Transaminases have improved since. Suspect this is due to alcohol hepatitis. Discriminant function is low so does not meet criteria for steroids. Viral hepatitis panel is neg for acute Hep A or B. + HCV AB c/w prior infection but last HCV RNA was neg. CT with fatty liver.   Ultrasound also with fatty liver and hepatomegaly but no gallstones or biliary dilation. Negative acetaminophen level.  ferritin elevated, likely d/t alcohol but iron sat was 90% so will send hemochromatosis gene test.   Alcohol abuse    PLAN    -f/u BRAD, ceruloplasmin, hemochromatosis gene test  - follow liver enzymes for improvement off alcohol  - ok for d/c from GI standpoint    Discussed with Dr. Geri Gibbs, [address] [place] [address]

## 2022-04-15 NOTE — PROGRESS NOTES
Discharge instructions given to patient including to f/u with pcp in 1 week, iv removed with no complication, pt verbalized understanding, discharged home with no needs, has plans to get alcohol treatment on his own.

## 2022-04-15 NOTE — PLAN OF CARE
Nutrition Problem #1: Unintended weight loss  Intervention: Food and/or Nutrient Delivery: Modify Current Diet  Nutritional Goals: PO intake 75% or greater

## 2022-04-19 LAB — CERULOPLASMIN: 12 MG/DL (ref 15–30)

## 2022-04-29 LAB
C282Y HEMOCHROMATOSIS MUT: NORMAL
H63D HEMOCHROMATOSIS MUT: NORMAL
HEMOCHROMATOSIS GENE ANALYSIS: NORMAL
HFE PCR SPECIMEN: NORMAL
S65C HEMOCHROMATOSIS MUT: NORMAL

## 2023-02-21 ENCOUNTER — HOSPITAL ENCOUNTER (INPATIENT)
Age: 38
LOS: 2 days | Discharge: LEFT BEFORE DISCHARGE | DRG: 770 | End: 2023-02-23
Attending: INTERNAL MEDICINE | Admitting: INTERNAL MEDICINE
Payer: COMMERCIAL

## 2023-02-21 DIAGNOSIS — E80.6 HYPERBILIRUBINEMIA: Primary | ICD-10-CM

## 2023-02-21 DIAGNOSIS — R45.1 AGITATION: ICD-10-CM

## 2023-02-21 DIAGNOSIS — R74.01 TRANSAMINITIS: ICD-10-CM

## 2023-02-21 DIAGNOSIS — F10.10 ALCOHOL ABUSE: ICD-10-CM

## 2023-02-21 DIAGNOSIS — N30.00 ACUTE CYSTITIS WITHOUT HEMATURIA: ICD-10-CM

## 2023-02-21 LAB
A/G RATIO: 0.9 (ref 1.1–2.2)
ALBUMIN SERPL-MCNC: 3.3 G/DL (ref 3.4–5)
ALP BLD-CCNC: 205 U/L (ref 40–129)
ALT SERPL-CCNC: 81 U/L (ref 10–40)
AMMONIA: 39 UMOL/L (ref 16–60)
AMPHETAMINE SCREEN, URINE: ABNORMAL
AMYLASE: 56 U/L (ref 25–115)
ANION GAP SERPL CALCULATED.3IONS-SCNC: 13 MMOL/L (ref 3–16)
APTT: 23.8 SEC (ref 23–34.3)
AST SERPL-CCNC: 171 U/L (ref 15–37)
BACTERIA: NORMAL /HPF
BARBITURATE SCREEN URINE: ABNORMAL
BASOPHILS ABSOLUTE: 0.1 K/UL (ref 0–0.2)
BASOPHILS RELATIVE PERCENT: 3 %
BENZODIAZEPINE SCREEN, URINE: POSITIVE
BILIRUB SERPL-MCNC: 5.3 MG/DL (ref 0–1)
BILIRUBIN URINE: ABNORMAL
BLOOD, URINE: ABNORMAL
BUN BLDV-MCNC: 16 MG/DL (ref 7–20)
CALCIUM SERPL-MCNC: 8.7 MG/DL (ref 8.3–10.6)
CANNABINOID SCREEN URINE: POSITIVE
CHLORIDE BLD-SCNC: 100 MMOL/L (ref 99–110)
CLARITY: CLEAR
CO2: 23 MMOL/L (ref 21–32)
COCAINE METABOLITE SCREEN URINE: POSITIVE
COLOR: ABNORMAL
CREAT SERPL-MCNC: <0.5 MG/DL (ref 0.9–1.3)
EKG ATRIAL RATE: 82 BPM
EKG DIAGNOSIS: NORMAL
EKG P AXIS: 34 DEGREES
EKG P-R INTERVAL: 150 MS
EKG Q-T INTERVAL: 378 MS
EKG QRS DURATION: 100 MS
EKG QTC CALCULATION (BAZETT): 441 MS
EKG R AXIS: 11 DEGREES
EKG T AXIS: 20 DEGREES
EKG VENTRICULAR RATE: 82 BPM
EOSINOPHILS ABSOLUTE: 0 K/UL (ref 0–0.6)
EOSINOPHILS RELATIVE PERCENT: 0.4 %
EPITHELIAL CELLS, UA: 2 /HPF (ref 0–5)
ETHANOL: 174 MG/DL (ref 0–0.08)
FENTANYL SCREEN, URINE: ABNORMAL
GFR SERPL CREATININE-BSD FRML MDRD: >60 ML/MIN/{1.73_M2}
GLUCOSE BLD-MCNC: 130 MG/DL (ref 70–99)
GLUCOSE URINE: NEGATIVE MG/DL
HCT VFR BLD CALC: 42 % (ref 40.5–52.5)
HEMOGLOBIN: 14 G/DL (ref 13.5–17.5)
HYALINE CASTS: 3 /LPF (ref 0–8)
INR BLD: 1.02 (ref 0.87–1.14)
INR BLD: 1.06 (ref 0.87–1.14)
KETONES, URINE: ABNORMAL MG/DL
LEUKOCYTE ESTERASE, URINE: ABNORMAL
LIPASE: 63 U/L (ref 13–60)
LYMPHOCYTES ABSOLUTE: 1.5 K/UL (ref 1–5.1)
LYMPHOCYTES RELATIVE PERCENT: 36.5 %
Lab: ABNORMAL
MAGNESIUM: 2.2 MG/DL (ref 1.8–2.4)
MCH RBC QN AUTO: 28.4 PG (ref 26–34)
MCHC RBC AUTO-ENTMCNC: 33.4 G/DL (ref 31–36)
MCV RBC AUTO: 85.1 FL (ref 80–100)
METHADONE SCREEN, URINE: ABNORMAL
MICROSCOPIC EXAMINATION: YES
MONOCYTES ABSOLUTE: 0.3 K/UL (ref 0–1.3)
MONOCYTES RELATIVE PERCENT: 7.6 %
NEUTROPHILS ABSOLUTE: 2.1 K/UL (ref 1.7–7.7)
NEUTROPHILS RELATIVE PERCENT: 52.5 %
NITRITE, URINE: POSITIVE
OPIATE SCREEN URINE: ABNORMAL
OXYCODONE URINE: ABNORMAL
PDW BLD-RTO: 19 % (ref 12.4–15.4)
PH UA: 5.5
PH UA: 5.5 (ref 5–8)
PHENCYCLIDINE SCREEN URINE: ABNORMAL
PLATELET # BLD: 129 K/UL (ref 135–450)
PMV BLD AUTO: 8 FL (ref 5–10.5)
POTASSIUM SERPL-SCNC: 3.8 MMOL/L (ref 3.5–5.1)
PROTEIN UA: ABNORMAL MG/DL
PROTHROMBIN TIME: 13.3 SEC (ref 11.7–14.5)
PROTHROMBIN TIME: 13.7 SEC (ref 11.7–14.5)
RBC # BLD: 4.94 M/UL (ref 4.2–5.9)
RBC UA: 3 /HPF (ref 0–4)
SODIUM BLD-SCNC: 136 MMOL/L (ref 136–145)
SPECIFIC GRAVITY UA: >=1.03 (ref 1–1.03)
TOTAL PROTEIN: 7 G/DL (ref 6.4–8.2)
URINE REFLEX TO CULTURE: ABNORMAL
URINE TYPE: ABNORMAL
UROBILINOGEN, URINE: 1 E.U./DL
WBC # BLD: 4 K/UL (ref 4–11)
WBC UA: 0 /HPF (ref 0–5)

## 2023-02-21 PROCEDURE — 85610 PROTHROMBIN TIME: CPT

## 2023-02-21 PROCEDURE — 83735 ASSAY OF MAGNESIUM: CPT

## 2023-02-21 PROCEDURE — 82607 VITAMIN B-12: CPT

## 2023-02-21 PROCEDURE — 82746 ASSAY OF FOLIC ACID SERUM: CPT

## 2023-02-21 PROCEDURE — 99285 EMERGENCY DEPT VISIT HI MDM: CPT

## 2023-02-21 PROCEDURE — 6370000000 HC RX 637 (ALT 250 FOR IP): Performed by: PHYSICIAN ASSISTANT

## 2023-02-21 PROCEDURE — 2500000003 HC RX 250 WO HCPCS: Performed by: PHYSICIAN ASSISTANT

## 2023-02-21 PROCEDURE — 82077 ASSAY SPEC XCP UR&BREATH IA: CPT

## 2023-02-21 PROCEDURE — 2060000000 HC ICU INTERMEDIATE R&B

## 2023-02-21 PROCEDURE — 96374 THER/PROPH/DIAG INJ IV PUSH: CPT

## 2023-02-21 PROCEDURE — 80307 DRUG TEST PRSMV CHEM ANLYZR: CPT

## 2023-02-21 PROCEDURE — 82140 ASSAY OF AMMONIA: CPT

## 2023-02-21 PROCEDURE — 83690 ASSAY OF LIPASE: CPT

## 2023-02-21 PROCEDURE — 83540 ASSAY OF IRON: CPT

## 2023-02-21 PROCEDURE — 80074 ACUTE HEPATITIS PANEL: CPT

## 2023-02-21 PROCEDURE — 6370000000 HC RX 637 (ALT 250 FOR IP): Performed by: INTERNAL MEDICINE

## 2023-02-21 PROCEDURE — 93010 ELECTROCARDIOGRAM REPORT: CPT | Performed by: INTERNAL MEDICINE

## 2023-02-21 PROCEDURE — 82150 ASSAY OF AMYLASE: CPT

## 2023-02-21 PROCEDURE — 6370000000 HC RX 637 (ALT 250 FOR IP): Performed by: HOSPITALIST

## 2023-02-21 PROCEDURE — 81001 URINALYSIS AUTO W/SCOPE: CPT

## 2023-02-21 PROCEDURE — 2580000003 HC RX 258: Performed by: PHYSICIAN ASSISTANT

## 2023-02-21 PROCEDURE — 93005 ELECTROCARDIOGRAM TRACING: CPT | Performed by: PHYSICIAN ASSISTANT

## 2023-02-21 PROCEDURE — 85025 COMPLETE CBC W/AUTO DIFF WBC: CPT

## 2023-02-21 PROCEDURE — 85730 THROMBOPLASTIN TIME PARTIAL: CPT

## 2023-02-21 PROCEDURE — 6360000002 HC RX W HCPCS: Performed by: PHYSICIAN ASSISTANT

## 2023-02-21 PROCEDURE — HZ2ZZZZ DETOXIFICATION SERVICES FOR SUBSTANCE ABUSE TREATMENT: ICD-10-PCS | Performed by: INTERNAL MEDICINE

## 2023-02-21 PROCEDURE — 96375 TX/PRO/DX INJ NEW DRUG ADDON: CPT

## 2023-02-21 PROCEDURE — 2580000003 HC RX 258: Performed by: INTERNAL MEDICINE

## 2023-02-21 PROCEDURE — 80053 COMPREHEN METABOLIC PANEL: CPT

## 2023-02-21 PROCEDURE — 83550 IRON BINDING TEST: CPT

## 2023-02-21 RX ORDER — SODIUM CHLORIDE 9 MG/ML
INJECTION, SOLUTION INTRAVENOUS PRN
Status: DISCONTINUED | OUTPATIENT
Start: 2023-02-21 | End: 2023-02-23 | Stop reason: HOSPADM

## 2023-02-21 RX ORDER — LORAZEPAM 1 MG/1
4 TABLET ORAL
Status: DISCONTINUED | OUTPATIENT
Start: 2023-02-21 | End: 2023-02-23 | Stop reason: HOSPADM

## 2023-02-21 RX ORDER — LORAZEPAM 2 MG/ML
1 CONCENTRATE ORAL
Status: DISCONTINUED | OUTPATIENT
Start: 2023-02-21 | End: 2023-02-23 | Stop reason: HOSPADM

## 2023-02-21 RX ORDER — IBUPROFEN 200 MG
200 TABLET ORAL ONCE
Status: COMPLETED | OUTPATIENT
Start: 2023-02-21 | End: 2023-02-21

## 2023-02-21 RX ORDER — ACETAMINOPHEN 650 MG/1
650 SUPPOSITORY RECTAL EVERY 6 HOURS PRN
Status: CANCELLED | OUTPATIENT
Start: 2023-02-21

## 2023-02-21 RX ORDER — ONDANSETRON 4 MG/1
4 TABLET, ORALLY DISINTEGRATING ORAL EVERY 8 HOURS PRN
Status: DISCONTINUED | OUTPATIENT
Start: 2023-02-21 | End: 2023-02-23 | Stop reason: HOSPADM

## 2023-02-21 RX ORDER — LORAZEPAM 1 MG/1
2 TABLET ORAL
Status: DISCONTINUED | OUTPATIENT
Start: 2023-02-21 | End: 2023-02-23 | Stop reason: HOSPADM

## 2023-02-21 RX ORDER — ACETAMINOPHEN 325 MG/1
650 TABLET ORAL EVERY 6 HOURS PRN
Status: CANCELLED | OUTPATIENT
Start: 2023-02-21

## 2023-02-21 RX ORDER — MULTIVITAMIN WITH IRON
1 TABLET ORAL DAILY
Status: DISCONTINUED | OUTPATIENT
Start: 2023-02-21 | End: 2023-02-23 | Stop reason: HOSPADM

## 2023-02-21 RX ORDER — POLYETHYLENE GLYCOL 3350 17 G/17G
17 POWDER, FOR SOLUTION ORAL DAILY PRN
Status: DISCONTINUED | OUTPATIENT
Start: 2023-02-21 | End: 2023-02-23 | Stop reason: HOSPADM

## 2023-02-21 RX ORDER — LORAZEPAM 1 MG/1
1 TABLET ORAL
Status: DISCONTINUED | OUTPATIENT
Start: 2023-02-21 | End: 2023-02-23 | Stop reason: HOSPADM

## 2023-02-21 RX ORDER — LORAZEPAM 1 MG/1
3 TABLET ORAL
Status: DISCONTINUED | OUTPATIENT
Start: 2023-02-21 | End: 2023-02-23 | Stop reason: HOSPADM

## 2023-02-21 RX ORDER — ONDANSETRON 2 MG/ML
4 INJECTION INTRAMUSCULAR; INTRAVENOUS EVERY 6 HOURS PRN
Status: DISCONTINUED | OUTPATIENT
Start: 2023-02-21 | End: 2023-02-23 | Stop reason: HOSPADM

## 2023-02-21 RX ORDER — LORAZEPAM 2 MG/ML
2 CONCENTRATE ORAL
Status: DISCONTINUED | OUTPATIENT
Start: 2023-02-21 | End: 2023-02-23 | Stop reason: HOSPADM

## 2023-02-21 RX ORDER — SODIUM CHLORIDE, SODIUM LACTATE, POTASSIUM CHLORIDE, CALCIUM CHLORIDE 600; 310; 30; 20 MG/100ML; MG/100ML; MG/100ML; MG/100ML
INJECTION, SOLUTION INTRAVENOUS CONTINUOUS
Status: DISCONTINUED | OUTPATIENT
Start: 2023-02-21 | End: 2023-02-22

## 2023-02-21 RX ORDER — THIAMINE HYDROCHLORIDE 100 MG/ML
100 INJECTION, SOLUTION INTRAMUSCULAR; INTRAVENOUS DAILY
Status: DISCONTINUED | OUTPATIENT
Start: 2023-02-22 | End: 2023-02-23 | Stop reason: HOSPADM

## 2023-02-21 RX ORDER — SODIUM CHLORIDE 0.9 % (FLUSH) 0.9 %
5-40 SYRINGE (ML) INJECTION PRN
Status: DISCONTINUED | OUTPATIENT
Start: 2023-02-21 | End: 2023-02-23 | Stop reason: HOSPADM

## 2023-02-21 RX ORDER — LORAZEPAM 2 MG/ML
4 CONCENTRATE ORAL
Status: DISCONTINUED | OUTPATIENT
Start: 2023-02-21 | End: 2023-02-23 | Stop reason: HOSPADM

## 2023-02-21 RX ORDER — SODIUM CHLORIDE 0.9 % (FLUSH) 0.9 %
5-40 SYRINGE (ML) INJECTION EVERY 12 HOURS SCHEDULED
Status: DISCONTINUED | OUTPATIENT
Start: 2023-02-21 | End: 2023-02-23 | Stop reason: HOSPADM

## 2023-02-21 RX ORDER — LORAZEPAM 2 MG/ML
3 CONCENTRATE ORAL
Status: DISCONTINUED | OUTPATIENT
Start: 2023-02-21 | End: 2023-02-23 | Stop reason: HOSPADM

## 2023-02-21 RX ORDER — ENOXAPARIN SODIUM 100 MG/ML
30 INJECTION SUBCUTANEOUS 2 TIMES DAILY
Status: DISCONTINUED | OUTPATIENT
Start: 2023-02-21 | End: 2023-02-23 | Stop reason: HOSPADM

## 2023-02-21 RX ORDER — LORAZEPAM 2 MG/ML
1 INJECTION INTRAMUSCULAR ONCE
Status: COMPLETED | OUTPATIENT
Start: 2023-02-21 | End: 2023-02-21

## 2023-02-21 RX ADMIN — IBUPROFEN 200 MG: 200 TABLET, FILM COATED ORAL at 17:50

## 2023-02-21 RX ADMIN — LORAZEPAM 2 MG: 1 TABLET ORAL at 13:45

## 2023-02-21 RX ADMIN — LORAZEPAM 1 MG: 2 INJECTION INTRAMUSCULAR; INTRAVENOUS at 12:26

## 2023-02-21 RX ADMIN — LORAZEPAM 3 MG: 1 TABLET ORAL at 17:51

## 2023-02-21 RX ADMIN — LORAZEPAM 1 MG: 1 TABLET ORAL at 16:11

## 2023-02-21 RX ADMIN — LORAZEPAM 3 MG: 1 TABLET ORAL at 22:29

## 2023-02-21 RX ADMIN — LORAZEPAM 3 MG: 1 TABLET ORAL at 19:38

## 2023-02-21 RX ADMIN — LORAZEPAM 4 MG: 1 TABLET ORAL at 21:08

## 2023-02-21 RX ADMIN — SODIUM CHLORIDE, POTASSIUM CHLORIDE, SODIUM LACTATE AND CALCIUM CHLORIDE: 600; 310; 30; 20 INJECTION, SOLUTION INTRAVENOUS at 17:45

## 2023-02-21 RX ADMIN — THERA TABS 1 TABLET: TAB at 17:51

## 2023-02-21 RX ADMIN — FOLIC ACID: 5 INJECTION, SOLUTION INTRAMUSCULAR; INTRAVENOUS; SUBCUTANEOUS at 12:34

## 2023-02-21 RX ADMIN — CEFTRIAXONE SODIUM 1000 MG: 1 INJECTION, POWDER, FOR SOLUTION INTRAMUSCULAR; INTRAVENOUS at 14:45

## 2023-02-21 ASSESSMENT — PAIN DESCRIPTION - ORIENTATION: ORIENTATION: MID

## 2023-02-21 ASSESSMENT — ENCOUNTER SYMPTOMS
NAUSEA: 0
SHORTNESS OF BREATH: 0
VOMITING: 0
COUGH: 0
COLOR CHANGE: 1
BACK PAIN: 0
DIARRHEA: 0
STRIDOR: 0
CONSTIPATION: 0
WHEEZING: 0
ABDOMINAL PAIN: 0

## 2023-02-21 ASSESSMENT — PAIN SCALES - GENERAL
PAINLEVEL_OUTOF10: 3
PAINLEVEL_OUTOF10: 2

## 2023-02-21 ASSESSMENT — PAIN DESCRIPTION - LOCATION: LOCATION: HEAD

## 2023-02-21 ASSESSMENT — LIFESTYLE VARIABLES
HOW OFTEN DO YOU HAVE A DRINK CONTAINING ALCOHOL: 4 OR MORE TIMES A WEEK
HOW MANY STANDARD DRINKS CONTAINING ALCOHOL DO YOU HAVE ON A TYPICAL DAY: 10 OR MORE

## 2023-02-21 ASSESSMENT — PAIN - FUNCTIONAL ASSESSMENT
PAIN_FUNCTIONAL_ASSESSMENT: PREVENTS OR INTERFERES SOME ACTIVE ACTIVITIES AND ADLS
PAIN_FUNCTIONAL_ASSESSMENT: NONE - DENIES PAIN

## 2023-02-21 ASSESSMENT — PAIN DESCRIPTION - DESCRIPTORS: DESCRIPTORS: ACHING;DULL

## 2023-02-21 NOTE — H&P
HOSPITALISTS HISTORY AND PHYSICAL    2/21/2023 2:11 PM    Patient Information:  LEONARDO HECK is a 37 y.o. male [id number]  PCP:  No primary care provider on file. (Tel: None )    Chief complaint:    Chief Complaint   Patient presents with    Jaundice     Pt coming in for yellow eyes, orange urine, hx of etoh induced hepatis is. Vodka this morning, 2 5ths of vodka daily, hx of herion 10years.          History of Present Illness:  Leonardo Heck is a 37 y.o. male who who noticed that he was becoming jaundiced thus came to the ED last drink was this morning drinks 1/5 of vodka daily.  Patient has also recently been doing cocaine snorting and marijuana occasionally smokes cigarettes.  No IV drug use for 10 years.  Patient states has a history of hepatitis C that cleared on its own.  Patient would like to quit drinking.  Denies any history of cirrhosis.      REVIEW OF SYSTEMS:   Constitutional: Negative for fever,chills or night sweats  ENT: Negative for rhinorrhea, epistaxis, hoarseness, sore throat.  Respiratory: Negative for shortness of breath,wheezing  Cardiovascular: Negative for chest pain, palpitations   Gastrointestinal: see above  Genitourinary: Negative for polyuria, dysuria   Hematologic/Lymphatic: Negative for bleeding tendency, easy bruising  Musculoskeletal: Negative for myalgias and arthralgias  Neurologic: Negative for confusion,dysarthria.  Skin: Negative for itching,rash  Psychiatric: Negative for depression,anxiety, agitation.  Endocrine: Negative for polydipsia,polyuria,heat /cold intolerance.    Past Medical History:   has a past medical history of ADHD, Depression, Hep C w/ coma, chronic, Hypertension, and Kidney stone.     Past Surgical History:   has a past surgical history that includes Carpal tunnel release and Arm Surgery (Right).     Medications:  No current facility-administered  medications on file prior to encounter. Current Outpatient Medications on File Prior to Encounter   Medication Sig Dispense Refill    Multiple Vitamin (MULTIVITAMIN) TABS tablet Take 1 tablet by mouth daily 30 tablet 0    vitamin B-1 (THIAMINE) 100 MG tablet Take 1 tablet by mouth daily 30 tablet 0    lidocaine (LIDODERM) 5 % Place 1 patch onto the skin daily 12 hours on, 12 hours off. 30 patch 0    buPROPion (WELLBUTRIN) 75 MG tablet Take 75 mg by mouth 2 times daily      cloNIDine (CATAPRES) 0.1 MG tablet 2 times daily       gabapentin (NEURONTIN) 400 MG capsule Take 300 mg by mouth 2 times daily. Allergies:  No Known Allergies     Social History:  Patient Lives a [place]   reports that he has never smoked. He has never used smokeless tobacco. He reports that he does not currently use alcohol. He reports current drug use. Frequency: 7.00 times per week. Drug: Marijuana Shellye Burkitt). Family History:  family history includes Cancer in an other family member; Diabetes in his maternal grandfather, mother, and paternal grandfather; No Known Problems in his father. ,     Physical Exam:  BP (!) 159/102   Pulse 93   Temp 97.7 °F (36.5 °C) (Oral)   Resp 20   Ht 5' 10\" (1.778 m)   Wt 300 lb (136.1 kg)   SpO2 96%   BMI 43.05 kg/m²     General appearance:  Appears comfortable. AAOx3  HEENT: atraumatic, Pupils equal, muscous membranes moist, no masses appreciated + sclearl icterus  Cardiovascular: tachycardia  no murmurs appreciated  Respiratory: CTAB no wheezing  Gastrointestinal: Abdomen soft, non-tender, BS+  EXT: trace edema  Neurology: no gross focal deficts  Psychiatry: Appropriate affect.  Not agitated  Skin: Warm, dry, no rashes appreciated    Labs:  CBC:   Lab Results   Component Value Date/Time    WBC 4.0 02/21/2023 11:13 AM    RBC 4.94 02/21/2023 11:13 AM    HGB 14.0 02/21/2023 11:13 AM    HCT 42.0 02/21/2023 11:13 AM    MCV 85.1 02/21/2023 11:13 AM    MCH 28.4 02/21/2023 11:13 AM    MCHC 33.4 02/21/2023 11:13 AM    RDW 19.0 02/21/2023 11:13 AM     02/21/2023 11:13 AM    MPV 8.0 02/21/2023 11:13 AM     BMP:    Lab Results   Component Value Date/Time     02/21/2023 11:14 AM    K 3.8 02/21/2023 11:14 AM    K 3.6 04/15/2022 05:59 AM     02/21/2023 11:14 AM    CO2 23 02/21/2023 11:14 AM    BUN 16 02/21/2023 11:14 AM    CREATININE <0.5 02/21/2023 11:14 AM    CALCIUM 8.7 02/21/2023 11:14 AM    GFRAA >60 04/15/2022 05:59 AM    LABGLOM >60 02/21/2023 11:14 AM    GLUCOSE 130 02/21/2023 11:14 AM     No orders to display       Recent imaging reviewed    Problem List  Principal Problem:    Acute alcohol abuse  Resolved Problems:    * No resolved hospital problems. *        Assessment/Plan:   Acute etoh withdrawal   Ciwa  Iv ativan  - iv thiamine  - iv fluids    ? alcoholic hepatitis  =- trend lfts  - check hep panel  - inr   - ammonia  - gi consulted    Thrombocytopenia secondary to above ,  - trend  -check iron b12 folate    Polysubstance abuse cocaine marijunan and etoh  - counseled on need to quit  - no bblockers    DVT prophylaxis lovenox  Code status full code        Admit as inpatient I anticipate hospitalization spanning more than two midnights for investigation and treatment of the above medically necessary diagnoses. Please note that some part of this chart was generated using Dragon dictation software. Although every effort was made to ensure the accuracy of this automated transcription, some errors in transcription may have occurred inadvertently. If you may need any clarification, please do not hesitate to contact me through [facility].        Keshav Knapp MD    2/21/2023 2:11 PM

## 2023-02-21 NOTE — ED PROVIDER NOTES
[address] [facility]        Pt Name: Summer Palumbo  MRN: [id number]  [place] 1985  Date of evaluation: 2/21/2023  Provider: Fabio Guzmán PA-C  PCP: No primary care provider on file. Note Started: 11:43 AM EST 2/21/23      SHERIDAN. I have evaluated this patient. My supervising physician was available for consultation. CHIEF COMPLAINT       Chief Complaint   Patient presents with    Jaundice     Pt coming in for yellow eyes, orange urine, hx of etoh induced hepatis is. Vodka this morning, 2 5ths of vodka daily, hx of herion 10years. HISTORY OF PRESENT ILLNESS: 1 or more Elements     History from : Patient    Limitations to history : None    Summer Palubmo is a 40 y.o. male who presents to the emergency department stating that his skin appears more jaundiced and he has scleral icterus for the past 3 days. 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 He used to use IV heroin but quit over 10 years ago. 2 days ago, he felt increasingly anxious and therefore started smoking some cigarettes. However, had previously quit. He also admits that he snorted cocaine 2 days ago. However, states that he does not regularly use illicit drugs. He drinks vodka every day. He drank vodka this morning. He typically gets the shakes with alcohol withdrawal.  He denies history of seizure. Nursing Notes were all reviewed and agreed with or any disagreements were addressed in the HPI. REVIEW OF SYSTEMS :      Review of Systems   Constitutional:  Negative for chills and fever. HENT: Negative. Eyes:  Negative for visual disturbance. Respiratory:  Negative for cough, shortness of breath, wheezing and stridor. Cardiovascular:  Negative for chest pain, palpitations and leg swelling. Gastrointestinal:  Negative for abdominal pain, constipation, diarrhea, nausea and vomiting. Endocrine: Negative. Genitourinary: Negative. Musculoskeletal:  Positive for myalgias. Negative for back pain, neck pain and neck stiffness. Skin:  Positive for color change. Negative for pallor, rash and wound. Neurological: Negative. Psychiatric/Behavioral:  Positive for agitation. Negative for confusion, hallucinations, self-injury, sleep disturbance and suicidal ideas. The patient is nervous/anxious. All other systems reviewed and are negative. Positives and Pertinent negatives as per HPI. SURGICAL HISTORY     Past Surgical History:   Procedure Laterality Date    ARM SURGERY Right     CARPAL TUNNEL RELEASE         CURRENTMEDICATIONS       Previous Medications    BUPROPION (WELLBUTRIN) 75 MG TABLET    Take 75 mg by mouth 2 times daily    CLONIDINE (CATAPRES) 0.1 MG TABLET    2 times daily     GABAPENTIN (NEURONTIN) 400 MG CAPSULE    Take 300 mg by mouth 2 times daily. LIDOCAINE (LIDODERM) 5 %    Place 1 patch onto the skin daily 12 hours on, 12 hours off. MULTIPLE VITAMIN (MULTIVITAMIN) TABS TABLET    Take 1 tablet by mouth daily    VITAMIN B-1 (THIAMINE) 100 MG TABLET    Take 1 tablet by mouth daily       ALLERGIES     Patient has no known allergies.     FAMILYHISTORY       Family History   Problem Relation Age of Onset    Diabetes Mother     No Known Problems Father     Diabetes Maternal Grandfather     Diabetes Paternal Grandfather     Cancer Other         Ear, throat cancer        SOCIAL HISTORY       Social History     Tobacco Use    Smoking status: Never    Smokeless tobacco: Never   Vaping Use    Vaping Use: Never used   Substance Use Topics    Alcohol use: Not Currently     Comment: stopped a year ago    Drug use: Yes     Frequency: 7.0 times per week     Types: Marijuana (Weed)       SCREENINGS        [place] Coma Scale  Eye Opening: Spontaneous  Best Verbal Response: Oriented  Best Motor Response: Obeys commands  Shant Coma Scale Score: 15                CIWA Assessment  BP: (!) 159/102  Heart Rate: 93  Nausea and Vomiting: no nausea and no vomiting  Tactile Disturbances: none  Tremor: no tremor  Auditory Disturbances: not present  Paroxysmal Sweats: no sweat visible  Visual Disturbances: not present  Anxiety: 2  Headache, Fullness in Head: none present  Agitation: somewhat more than normal activity  Orientation and Clouding of Sensorium: oriented and can do serial additions  CIWA-Ar Total: 3           PHYSICAL EXAM  1 or more Elements     ED Triage Vitals [02/21/23 1028]   BP Temp Temp Source Heart Rate Resp SpO2 Height Weight   (!) 159/102 97.7 °F (36.5 °C) Oral 93 20 96 % 5' 10\" (1.778 m) 300 lb (136.1 kg)       Physical Exam  Vitals and nursing note reviewed. Constitutional:       Appearance: Normal appearance. He is well-developed. He is obese. He is not toxic-appearing or diaphoretic. HENT:      Head: Normocephalic and atraumatic. Right Ear: External ear normal.      Left Ear: External ear normal.      Nose: Nose normal.      Mouth/Throat:      Mouth: Mucous membranes are moist.      Pharynx: Oropharynx is clear. Eyes:      General: Scleral icterus present. Right eye: No discharge. Left eye: No discharge. Extraocular Movements: Extraocular movements intact. Conjunctiva/sclera: Conjunctivae normal.      Pupils: Pupils are equal, round, and reactive to light. Cardiovascular:      Rate and Rhythm: Normal rate. Pulmonary:      Effort: Pulmonary effort is normal.      Breath sounds: Normal breath sounds. Abdominal:      General: Bowel sounds are normal.      Palpations: Abdomen is soft. Tenderness: There is no abdominal tenderness. Musculoskeletal:         General: Normal range of motion. Cervical back: Normal range of motion. Skin:     General: Skin is warm and dry. Capillary Refill: Capillary refill takes less than 2 seconds. Coloration: Skin is jaundiced. Skin is not pale. Findings: No bruising, erythema, lesion or rash.    Neurological:      General: No focal deficit present. Mental Status: He is alert and oriented to person, place, and time. Psychiatric:         Attention and Perception: Attention and perception normal.         Mood and Affect: Mood is anxious. Speech: Speech normal.         Behavior: Behavior is agitated. Thought Content: Thought content normal.         Cognition and Memory: Cognition and memory normal.         Judgment: Judgment normal.           DIAGNOSTIC RESULTS   LABS:    Labs Reviewed   CBC WITH AUTO DIFFERENTIAL - Abnormal; Notable for the following components:       Result Value    RDW 19.0 (*)     Platelets 054 (*)     All other components within normal limits   COMPREHENSIVE METABOLIC PANEL - Abnormal; Notable for the following components:    Glucose 130 (*)     Creatinine <0.5 (*)     Albumin 3.3 (*)     Albumin/Globulin Ratio 0.9 (*)     Total Bilirubin 5.3 (*)     Alkaline Phosphatase 205 (*)     ALT 81 (*)      (*)     All other components within normal limits   LIPASE - Abnormal; Notable for the following components:    Lipase 63.0 (*)     All other components within normal limits   URINALYSIS WITH REFLEX TO CULTURE - Abnormal; Notable for the following components:    Color, UA DARK YELLOW (*)     Bilirubin Urine LARGE (*)     Ketones, Urine TRACE (*)     Blood, Urine TRACE (*)     Protein, UA TRACE (*)     Nitrite, Urine POSITIVE (*)     Leukocyte Esterase, Urine TRACE (*)     All other components within normal limits   MAGNESIUM   AMYLASE   PROTIME-INR   APTT   URINE DRUG SCREEN   ETHANOL   MICROSCOPIC URINALYSIS       When ordered only abnormal lab results are displayed. All other labs were within normal range or not returned as of this dictation. EKG: When ordered, EKG's are interpreted by the Emergency Department Physician in the absence of a cardiologist.  Please see their note for interpretation of EKG.     RADIOLOGY:   Non-plain film images such as CT, Ultrasound and MRI are read by the radiologist. Lui Carias radiographic images are visualized and preliminarily interpreted by the ED Provider with the below findings:        Interpretation per the Radiologist below, if available at the time of this note:    No orders to display     No results found. No results found. PROCEDURES   Unless otherwise noted below, none     Procedures    CRITICAL CARE TIME (.cctime)   There was a high probability of life-threatening clinical deterioration in the patient's condition requiring my urgent intervention. I personally saw the patient and independently provided 35 minutes of non-concurrent critical care out of the total shared critical care time provided, excluding separately reportable procedures. PAST MEDICAL HISTORY         Chronic Conditions affecting Care:  has a past medical history of ADHD, Depression, Hep C w/ coma, chronic, Hypertension, and Kidney stone. EMERGENCY DEPARTMENT COURSE and DIFFERENTIAL DIAGNOSIS/MDM:   Vitals:    Vitals:    02/21/23 1028 02/21/23 1106   BP: (!) 159/102 (!) 159/102   Pulse: 93 93   Resp: 20    Temp: 97.7 °F (36.5 °C)    TempSrc: Oral    SpO2: 96%    Weight: 300 lb (136.1 kg)    Height: 5' 10\" (1.778 m)        Patient was given the following medications:  Medications   sodium chloride 0.9 % 1,729 mL with folic acid 1 mg, adult multi-vitamin with vitamin k 10 mL, thiamine 300 mg (has no administration in time range)   cefTRIAXone (ROCEPHIN) 1,000 mg in sodium chloride 0.9 % 50 mL IVPB (mini-bag) (has no administration in time range)   LORazepam (ATIVAN) injection 1 mg (1 mg IntraVENous Given 2/21/23 1226)             Is this patient to be included in the SEP-1 Core Measure due to severe sepsis or septic shock?    No   Exclusion criteria - the patient is NOT to be included for SEP-1 Core Measure due to:  2+ SIRS criteria are not met    CONSULTS: (Who and What was discussed)  IP CONSULT TO HOSPITALIST  Discussion with Other Profesionals : Admitting Team hospitalist paged at [phone]    Social Determinants : substance, cigarette and alcohol abuse    Records Reviewed : Outpatient Notes Dr Jose G Li note 2022    CC/HPI Summary, DDx, ED Course, and Reassessment: This patient presents to the emergency department with jaundice and scleral icterus. He feels increasingly anxious and agitated and believes that he is in the early stages of alcohol withdrawal.  His CIWA score is still low. However, we will continue to monitor this. Urine shows infection however no overt evidence of sepsis at this time. I did order IV Ativan for anxiety and to help with alcohol withdrawal.  I did order banana bag fluids with IV Rocephin for UTI. Blood work shows hyperbilirubinemia and transaminitis. This is consistent with his presentation and history. I do feel admission is warranted for further management and to assist his withdrawal symptoms. Disposition Considerations (include 1 Tests not done, Shared Decision Making, Pt Expectation of Test or Tx.): Abdomen is soft and nontender therefore do not feel emergent imaging warranted at this time. I am the Primary Clinician of Record. FINAL IMPRESSION      1. Hyperbilirubinemia    2. Transaminitis    3. Alcohol abuse    4. Agitation    5. Acute cystitis without hematuria          DISPOSITION/PLAN     DISPOSITION Decision To Admit 02/21/2023 12:21:43 PM      PATIENT REFERRED TO:  No follow-up provider specified.     DISCHARGE MEDICATIONS:  New Prescriptions    No medications on file       DISCONTINUED MEDICATIONS:  Discontinued Medications    No medications on file              (Please note that portions of this note were completed with a voice recognition program.  Efforts were made to edit the dictations but occasionally words are mis-transcribed.)    Murry Soulier, PA-C (electronically signed)           Murry Soulier, PA-C  02/21/23 9845

## 2023-02-21 NOTE — PROGRESS NOTES
Pt in room 3905. Admission complete. IVF's started. CIWA 17. Ativan given. One time dose of Motrin given for a headache. Seizure and fall precautions in place. Wife notified of admission by patient.

## 2023-02-22 LAB
A/G RATIO: 1 (ref 1.1–2.2)
ALBUMIN SERPL-MCNC: 2.8 G/DL (ref 3.4–5)
ALP BLD-CCNC: 165 U/L (ref 40–129)
ALT SERPL-CCNC: 61 U/L (ref 10–40)
ANION GAP SERPL CALCULATED.3IONS-SCNC: 9 MMOL/L (ref 3–16)
ANISOCYTOSIS: ABNORMAL
AST SERPL-CCNC: 130 U/L (ref 15–37)
BASOPHILS ABSOLUTE: 0 K/UL (ref 0–0.2)
BASOPHILS RELATIVE PERCENT: 0 %
BILIRUB SERPL-MCNC: 5.1 MG/DL (ref 0–1)
BUN BLDV-MCNC: 13 MG/DL (ref 7–20)
CALCIUM SERPL-MCNC: 8.4 MG/DL (ref 8.3–10.6)
CHLORIDE BLD-SCNC: 101 MMOL/L (ref 99–110)
CO2: 23 MMOL/L (ref 21–32)
CREAT SERPL-MCNC: <0.5 MG/DL (ref 0.9–1.3)
EOSINOPHILS ABSOLUTE: 0.1 K/UL (ref 0–0.6)
EOSINOPHILS RELATIVE PERCENT: 2 %
FOLATE: >20 NG/ML (ref 4.78–24.2)
GFR SERPL CREATININE-BSD FRML MDRD: >60 ML/MIN/{1.73_M2}
GLUCOSE BLD-MCNC: 114 MG/DL (ref 70–99)
HAV IGM SER IA-ACNC: ABNORMAL
HCT VFR BLD CALC: 36.3 % (ref 40.5–52.5)
HEMOGLOBIN: 11.8 G/DL (ref 13.5–17.5)
HEPATITIS B CORE IGM ANTIBODY: ABNORMAL
HEPATITIS B SURFACE ANTIGEN INTERPRETATION: ABNORMAL
HEPATITIS C ANTIBODY INTERPRETATION: REACTIVE
IRON SATURATION: 89 % (ref 20–50)
IRON: 193 UG/DL (ref 59–158)
LYMPHOCYTES ABSOLUTE: 1.5 K/UL (ref 1–5.1)
LYMPHOCYTES RELATIVE PERCENT: 37 %
MAGNESIUM: 1.9 MG/DL (ref 1.8–2.4)
MCH RBC QN AUTO: 28.1 PG (ref 26–34)
MCHC RBC AUTO-ENTMCNC: 32.5 G/DL (ref 31–36)
MCV RBC AUTO: 86.4 FL (ref 80–100)
MONOCYTES ABSOLUTE: 0.3 K/UL (ref 0–1.3)
MONOCYTES RELATIVE PERCENT: 7 %
NEUTROPHILS ABSOLUTE: 2.2 K/UL (ref 1.7–7.7)
NEUTROPHILS RELATIVE PERCENT: 54 %
PDW BLD-RTO: 18.2 % (ref 12.4–15.4)
PLATELET # BLD: 107 K/UL (ref 135–450)
PLATELET SLIDE REVIEW: ABNORMAL
PMV BLD AUTO: 8.3 FL (ref 5–10.5)
POTASSIUM REFLEX MAGNESIUM: 3.5 MMOL/L (ref 3.5–5.1)
RBC # BLD: 4.2 M/UL (ref 4.2–5.9)
SLIDE REVIEW: ABNORMAL
SODIUM BLD-SCNC: 133 MMOL/L (ref 136–145)
TOTAL IRON BINDING CAPACITY: 216 UG/DL (ref 260–445)
TOTAL PROTEIN: 5.6 G/DL (ref 6.4–8.2)
VITAMIN B-12: 1103 PG/ML (ref 211–911)
WBC # BLD: 4 K/UL (ref 4–11)

## 2023-02-22 PROCEDURE — 36415 COLL VENOUS BLD VENIPUNCTURE: CPT

## 2023-02-22 PROCEDURE — 2000000000 HC ICU R&B

## 2023-02-22 PROCEDURE — 6370000000 HC RX 637 (ALT 250 FOR IP): Performed by: PHYSICIAN ASSISTANT

## 2023-02-22 PROCEDURE — 2500000003 HC RX 250 WO HCPCS: Performed by: STUDENT IN AN ORGANIZED HEALTH CARE EDUCATION/TRAINING PROGRAM

## 2023-02-22 PROCEDURE — 6360000002 HC RX W HCPCS: Performed by: INTERNAL MEDICINE

## 2023-02-22 PROCEDURE — 83735 ASSAY OF MAGNESIUM: CPT

## 2023-02-22 PROCEDURE — 80053 COMPREHEN METABOLIC PANEL: CPT

## 2023-02-22 PROCEDURE — 6370000000 HC RX 637 (ALT 250 FOR IP): Performed by: INTERNAL MEDICINE

## 2023-02-22 PROCEDURE — 2580000003 HC RX 258: Performed by: INTERNAL MEDICINE

## 2023-02-22 PROCEDURE — 87522 HEPATITIS C REVRS TRNSCRPJ: CPT

## 2023-02-22 PROCEDURE — 81256 HFE GENE: CPT

## 2023-02-22 PROCEDURE — 85025 COMPLETE CBC W/AUTO DIFF WBC: CPT

## 2023-02-22 PROCEDURE — 6370000000 HC RX 637 (ALT 250 FOR IP): Performed by: STUDENT IN AN ORGANIZED HEALTH CARE EDUCATION/TRAINING PROGRAM

## 2023-02-22 RX ORDER — DEXMEDETOMIDINE HYDROCHLORIDE 4 UG/ML
.1-1.5 INJECTION, SOLUTION INTRAVENOUS CONTINUOUS
Status: DISCONTINUED | OUTPATIENT
Start: 2023-02-22 | End: 2023-02-23

## 2023-02-22 RX ORDER — CHLORDIAZEPOXIDE HYDROCHLORIDE 25 MG/1
25 CAPSULE, GELATIN COATED ORAL 3 TIMES DAILY
Status: DISCONTINUED | OUTPATIENT
Start: 2023-02-22 | End: 2023-02-23

## 2023-02-22 RX ORDER — CHLORDIAZEPOXIDE HYDROCHLORIDE 25 MG/1
50 CAPSULE, GELATIN COATED ORAL 2 TIMES DAILY
Status: DISCONTINUED | OUTPATIENT
Start: 2023-02-22 | End: 2023-02-22

## 2023-02-22 RX ORDER — CHLORDIAZEPOXIDE HYDROCHLORIDE 25 MG/1
25 CAPSULE, GELATIN COATED ORAL 3 TIMES DAILY
Status: DISCONTINUED | OUTPATIENT
Start: 2023-02-22 | End: 2023-02-22

## 2023-02-22 RX ADMIN — LORAZEPAM 2 MG: 1 TABLET ORAL at 18:51

## 2023-02-22 RX ADMIN — LORAZEPAM 3 MG: 1 TABLET ORAL at 00:11

## 2023-02-22 RX ADMIN — LORAZEPAM 4 MG: 1 TABLET ORAL at 16:32

## 2023-02-22 RX ADMIN — LORAZEPAM 1 MG: 1 TABLET ORAL at 10:04

## 2023-02-22 RX ADMIN — CHLORDIAZEPOXIDE HYDROCHLORIDE 25 MG: 25 CAPSULE ORAL at 10:00

## 2023-02-22 RX ADMIN — CHLORDIAZEPOXIDE HYDROCHLORIDE 50 MG: 25 CAPSULE ORAL at 01:28

## 2023-02-22 RX ADMIN — ENOXAPARIN SODIUM 30 MG: 100 INJECTION SUBCUTANEOUS at 20:46

## 2023-02-22 RX ADMIN — THERA TABS 1 TABLET: TAB at 09:57

## 2023-02-22 RX ADMIN — CHLORDIAZEPOXIDE HYDROCHLORIDE 25 MG: 25 CAPSULE ORAL at 12:44

## 2023-02-22 RX ADMIN — THIAMINE HYDROCHLORIDE 100 MG: 100 INJECTION, SOLUTION INTRAMUSCULAR; INTRAVENOUS at 10:01

## 2023-02-22 RX ADMIN — SODIUM CHLORIDE, PRESERVATIVE FREE 10 ML: 5 INJECTION INTRAVENOUS at 23:22

## 2023-02-22 RX ADMIN — CHLORDIAZEPOXIDE HYDROCHLORIDE 25 MG: 25 CAPSULE ORAL at 20:46

## 2023-02-22 RX ADMIN — LORAZEPAM 2 MG: 1 TABLET ORAL at 12:44

## 2023-02-22 RX ADMIN — LORAZEPAM 3 MG: 1 TABLET ORAL at 21:01

## 2023-02-22 RX ADMIN — DEXMEDETOMIDINE HYDROCHLORIDE 0.2 MCG/KG/HR: 4 INJECTION, SOLUTION INTRAVENOUS at 09:57

## 2023-02-22 RX ADMIN — LORAZEPAM 3 MG: 1 TABLET ORAL at 23:31

## 2023-02-22 RX ADMIN — DEXMEDETOMIDINE HYDROCHLORIDE 0.2 MCG/KG/HR: 4 INJECTION, SOLUTION INTRAVENOUS at 02:44

## 2023-02-22 NOTE — FLOWSHEET NOTE
Patient is oriented x4. Pleasant. Precedex gtt weaned off. Ativan and Librium given.       02/22/23 1242   CIWA-Ar   Nausea and Vomiting 1   Tactile Disturbances 2   Tremor 3   Auditory Disturbances 0   Paroxysmal Sweats 1   Visual Disturbances 0   Anxiety 1   Headache, Fullness in Head 3   Agitation 1   Orientation and Clouding of Sensorium 0   CIWA-Ar Total (!) 12

## 2023-02-22 NOTE — PROGRESS NOTES
[facility] PROGRESS NOTE    2/22/2023 9:09 AM        Name: Dawn Mello .              Admitted: 2/21/2023  Primary Care Provider: No primary care provider on file.  (Tel: None)      Subjective:    Elevated ciwa ovenriht multiple ativan doses started on precedex gtt, lethargic now    Reviewed interval ancillary notes    Current Medications  dexmedetomidine (PRECEDEX) 400 mcg in sodium chloride 0.9 % 100 mL infusion, Continuous  chlordiazePOXIDE (LIBRIUM) capsule 50 mg, BID  LORazepam (ATIVAN) tablet 1 mg, Q1H PRN   Or  LORazepam (ATIVAN) 2 MG/ML concentrated solution 1 mg, Q1H PRN   Or  LORazepam (ATIVAN) tablet 2 mg, Q1H PRN   Or  LORazepam (ATIVAN) 2 MG/ML concentrated solution 2 mg, Q1H PRN   Or  LORazepam (ATIVAN) tablet 3 mg, Q1H PRN   Or  LORazepam (ATIVAN) 2 MG/ML concentrated solution 3 mg, Q1H PRN   Or  LORazepam (ATIVAN) tablet 4 mg, Q1H PRN   Or  LORazepam (ATIVAN) 2 MG/ML concentrated solution 4 mg, Q1H PRN  sodium chloride flush 0.9 % injection 5-40 mL, 2 times per day  sodium chloride flush 0.9 % injection 5-40 mL, PRN  0.9 % sodium chloride infusion, PRN  enoxaparin Sodium (LOVENOX) injection 30 mg, BID  ondansetron (ZOFRAN-ODT) disintegrating tablet 4 mg, Q8H PRN   Or  ondansetron (ZOFRAN) injection 4 mg, Q6H PRN  polyethylene glycol (GLYCOLAX) packet 17 g, Daily PRN  lactated ringers IV soln infusion, Continuous  multivitamin 1 tablet, Daily  thiamine (B-1) injection 100 mg, Daily        Objective:  BP (!) 164/97   Pulse 69   Temp 98.6 °F (37 °C) (Temporal)   Resp 12   Ht 5' 10\" (1.778 m)   Wt (!) 313 lb 15 oz (142.4 kg)   SpO2 96%   BMI 45.04 kg/m²     Intake/Output Summary (Last 24 hours) at 2/22/2023 0909  Last data filed at 2/22/2023 0852  Gross per 24 hour   Intake 2525.75 ml   Output 400 ml   Net 2125.75 ml      Wt Readings from Last 3 Encounters:   02/22/23 (!) 313 lb 15 oz (142.4 kg)   04/15/22 (!) 306 lb 12.8 oz (139.2 kg)   03/08/21 (!) 307 lb 11.2 oz (139.6 kg)       General appearance: lethargic  AAOx3  HEENT: atraumatic, Pupils equal, muscous membranes moist, no masses appreciated  Cardiovascular: Regular rate and rhythm no murmurs appreciated  Respiratory: CTAB no wheezing  Gastrointestinal: Abdomen soft, non-tender, BS+  EXT: no edema  Neurology: no gross focal deficts + tremors  Psychiatry: Appropriate affect. Not agitated  Skin: Warm, dry, no rashes appreciated    Labs and Tests:  CBC:   Recent Labs     02/21/23  1113 02/22/23  0426   WBC 4.0 4.0   HGB 14.0 11.8*   * 107*     BMP:    Recent Labs     02/21/23  1114 02/22/23  0426    133*   K 3.8 3.5    101   CO2 23 23   BUN 16 13   CREATININE <0.5* <0.5*   GLUCOSE 130* 114*     Hepatic:   Recent Labs     02/21/23  1114 02/22/23  0426   * 130*   ALT 81* 61*   BILITOT 5.3* 5.1*   ALKPHOS 205* 165*     No orders to display       Recent imaging reviewed    Problem List  Principal Problem:    Acute alcohol abuse  Resolved Problems:    * No resolved hospital problems. *          Assessment/Plan:   Acute etoh withdrawal   Ciwa  Iv ativan  - iv thiamine  - wean of precdex     ? alcoholic hepatitis  - lfts table repeat labs in am  -  - gi consulted     Thrombocytopenia secondary to above ,  - monitor    Polysubstance abuse cocaine marijunan and etoh  - counseled on need to quit  - no bblockers     DVT prophylaxis lovenox  Code status full code      Brent Nails MD   2/22/2023 9:09 AM

## 2023-02-22 NOTE — CONSULTS
Gastroenterology Consult Note        Patient: Feliz Wild  : 1985  Acct#:      Date:  2023    Subjective:       History of Present Illness  Patient is a 40 y.o.  male admitted with Hyperbilirubinemia [E80.6]  Alcohol abuse [F10.10]  Agitation [R45.1]  Transaminitis [R74.01]  Acute alcohol abuse [F10.10]  Acute cystitis without hematuria [N30.00] who is seen in consult for jaundice. History of alcohol abuse. Has been drinking 2 bottles of liquor daily. History of drug use in the remote past.  He was seen inpatient 2022 for alcohol hepatitis. At one point was diagnosed with hepatitis C but then had RNA level in  which was negative. Does admit to snorting cocaine a few days prior to admission. Patient noticed yellowing of the skin and eyes so came to the ER. Denies abdominal pain, nausea, vomiting, GI bleeding. Past Medical History:   Diagnosis Date    ADHD     Depression     Hep C w/ coma, chronic     Hypertension     Kidney stone       Past Surgical History:   Procedure Laterality Date    ARM SURGERY Right     CARPAL TUNNEL RELEASE        Past Endoscopic History    Admission Meds  No current facility-administered medications on file prior to encounter. Current Outpatient Medications on File Prior to Encounter   Medication Sig Dispense Refill    Multiple Vitamin (MULTIVITAMIN) TABS tablet Take 1 tablet by mouth daily 30 tablet 0    vitamin B-1 (THIAMINE) 100 MG tablet Take 1 tablet by mouth daily 30 tablet 0    lidocaine (LIDODERM) 5 % Place 1 patch onto the skin daily 12 hours on, 12 hours off. 30 patch 0    buPROPion (WELLBUTRIN) 75 MG tablet Take 75 mg by mouth 2 times daily      cloNIDine (CATAPRES) 0.1 MG tablet 2 times daily       gabapentin (NEURONTIN) 400 MG capsule Take 300 mg by mouth 2 times daily.                Allergies  No Known Allergies   Social   Social History     Tobacco Use    Smoking status: Never    Smokeless tobacco: Never Substance Use Topics    Alcohol use: Not Currently     Comment: stopped a year ago        Family History   Problem Relation Age of Onset    Diabetes Mother     No Known Problems Father     Diabetes Maternal Grandfather     Diabetes Paternal Grandfather     Cancer Other         Ear, throat cancer               Physical Exam  Blood pressure (!) 144/84, pulse 78, temperature 98.6 °F (37 °C), temperature source Temporal, resp. rate 13, height 5' 10\" (1.778 m), weight (!) 313 lb 15 oz (142.4 kg), SpO2 96 %. General appearance: alert, cooperative, anxious, no distress, appears stated age  Eyes: Anicteric  Head: Normocephalic, without obvious abnormality  Lungs: clear to auscultation bilaterally, Normal Effort  Heart: regular rate and rhythm, normal S1 and S2, no murmurs or rubs  Abdomen: obese, soft, non-tender. Bowel sounds normal. No masses,  no organomegaly. Extremities: atraumatic, no cyanosis or edema  Skin: warm and dry, no jaundice  Neuro: Grossly intact, A&OX3, appears anxious  Musculoskeletal: 5/5  strength BUE      Data Review:    Recent Labs     02/21/23  1113 02/22/23  0426   WBC 4.0 4.0   HGB 14.0 11.8*   HCT 42.0 36.3*   MCV 85.1 86.4   * 107*     Recent Labs     02/21/23  1114 02/22/23  0426    133*   K 3.8 3.5    101   CO2 23 23   BUN 16 13   CREATININE <0.5* <0.5*     Recent Labs     02/21/23  1114 02/22/23  0426   * 130*   ALT 81* 61*   BILITOT 5.3* 5.1*   ALKPHOS 205* 165*     Recent Labs     02/21/23  1114   LIPASE 63.0*   AMYLASE 56     Recent Labs     02/21/23  1113 02/21/23  1442   PROTIME 13.3 13.7   INR 1.02 1.06     No results for input(s): PTT in the last 72 hours. No results for input(s): OCCULTBLD in the last 72 hours. Assessment/Plan:     Elevated liver enzymes -pattern consistent with alcohol hepatitis. AST greater than ALT. Had imaging including ultrasound and CT about a year ago with fatty liver, hepatomegaly, no gallstones.   Discriminant function is 17 so does not meet criteria for steroids.  He was negative for hepatitis a and B. Iron sat elevated at 89% which may be from alcohol but will check hemochromatosis gene test.   Monitor liver enzymes  Check hemochromatosis gene   Cessation.  Social work consult for alcohol rehab programs.  History of hepatitis C -but then had negative RNA level in 2016 with clearance of the virus.  Using drugs again so we will repeat RNA level.  Hepatitis C RNA level  Alcohol abuse, withdrawal    Discussed with TAMEKA nAand-C  [facility]

## 2023-02-22 NOTE — FLOWSHEET NOTE
Pt alseep. Wife and attending at bedside. Precedex gtt infusing. Will continue to monitor.         02/22/23 0804   CIWA-Ar   BP (!) 152/96   Heart Rate 66   Nausea and Vomiting 0   Tactile Disturbances 0   Tremor 0   Auditory Disturbances 0   Paroxysmal Sweats 0   Visual Disturbances 0   Anxiety 0   Headache, Fullness in Head 0   Agitation 0   Orientation and Clouding of Sensorium 0   CIWA-Ar Total 0

## 2023-02-23 VITALS
TEMPERATURE: 97.4 F | DIASTOLIC BLOOD PRESSURE: 98 MMHG | SYSTOLIC BLOOD PRESSURE: 158 MMHG | RESPIRATION RATE: 16 BRPM | HEART RATE: 101 BPM | OXYGEN SATURATION: 96 % | HEIGHT: 70 IN | WEIGHT: 309.08 LBS | BODY MASS INDEX: 44.25 KG/M2

## 2023-02-23 LAB
A/G RATIO: 0.9 (ref 1.1–2.2)
ALBUMIN SERPL-MCNC: 2.9 G/DL (ref 3.4–5)
ALP BLD-CCNC: 163 U/L (ref 40–129)
ALT SERPL-CCNC: 64 U/L (ref 10–40)
ANION GAP SERPL CALCULATED.3IONS-SCNC: 8 MMOL/L (ref 3–16)
AST SERPL-CCNC: 124 U/L (ref 15–37)
BASOPHILS ABSOLUTE: 0.1 K/UL (ref 0–0.2)
BASOPHILS RELATIVE PERCENT: 1.1 %
BILIRUB SERPL-MCNC: 6.2 MG/DL (ref 0–1)
BUN BLDV-MCNC: 9 MG/DL (ref 7–20)
CALCIUM SERPL-MCNC: 8.7 MG/DL (ref 8.3–10.6)
CHLORIDE BLD-SCNC: 105 MMOL/L (ref 99–110)
CO2: 24 MMOL/L (ref 21–32)
CREAT SERPL-MCNC: <0.5 MG/DL (ref 0.9–1.3)
EOSINOPHILS ABSOLUTE: 0.1 K/UL (ref 0–0.6)
EOSINOPHILS RELATIVE PERCENT: 1.3 %
GFR SERPL CREATININE-BSD FRML MDRD: >60 ML/MIN/{1.73_M2}
GLUCOSE BLD-MCNC: 123 MG/DL (ref 70–99)
HCT VFR BLD CALC: 37.3 % (ref 40.5–52.5)
HEMOGLOBIN: 11.9 G/DL (ref 13.5–17.5)
LYMPHOCYTES ABSOLUTE: 1.2 K/UL (ref 1–5.1)
LYMPHOCYTES RELATIVE PERCENT: 27.2 %
MCH RBC QN AUTO: 27.8 PG (ref 26–34)
MCHC RBC AUTO-ENTMCNC: 32 G/DL (ref 31–36)
MCV RBC AUTO: 86.9 FL (ref 80–100)
MONOCYTES ABSOLUTE: 0.3 K/UL (ref 0–1.3)
MONOCYTES RELATIVE PERCENT: 5.6 %
NEUTROPHILS ABSOLUTE: 3 K/UL (ref 1.7–7.7)
NEUTROPHILS RELATIVE PERCENT: 64.8 %
PDW BLD-RTO: 18.1 % (ref 12.4–15.4)
PLATELET # BLD: 107 K/UL (ref 135–450)
PMV BLD AUTO: 8.5 FL (ref 5–10.5)
POTASSIUM REFLEX MAGNESIUM: 3.6 MMOL/L (ref 3.5–5.1)
RBC # BLD: 4.29 M/UL (ref 4.2–5.9)
SODIUM BLD-SCNC: 137 MMOL/L (ref 136–145)
TOTAL PROTEIN: 6.3 G/DL (ref 6.4–8.2)
WBC # BLD: 4.6 K/UL (ref 4–11)

## 2023-02-23 PROCEDURE — 6370000000 HC RX 637 (ALT 250 FOR IP): Performed by: PHYSICIAN ASSISTANT

## 2023-02-23 PROCEDURE — 85025 COMPLETE CBC W/AUTO DIFF WBC: CPT

## 2023-02-23 PROCEDURE — 6370000000 HC RX 637 (ALT 250 FOR IP): Performed by: INTERNAL MEDICINE

## 2023-02-23 PROCEDURE — 80053 COMPREHEN METABOLIC PANEL: CPT

## 2023-02-23 PROCEDURE — 6360000002 HC RX W HCPCS: Performed by: INTERNAL MEDICINE

## 2023-02-23 PROCEDURE — 36415 COLL VENOUS BLD VENIPUNCTURE: CPT

## 2023-02-23 PROCEDURE — 2580000003 HC RX 258: Performed by: INTERNAL MEDICINE

## 2023-02-23 RX ORDER — HYDROXYZINE HYDROCHLORIDE 10 MG/1
10 TABLET, FILM COATED ORAL 3 TIMES DAILY PRN
Status: DISCONTINUED | OUTPATIENT
Start: 2023-02-23 | End: 2023-02-23 | Stop reason: HOSPADM

## 2023-02-23 RX ORDER — CHLORDIAZEPOXIDE HYDROCHLORIDE 5 MG/1
10 CAPSULE, GELATIN COATED ORAL 3 TIMES DAILY
Status: DISCONTINUED | OUTPATIENT
Start: 2023-02-23 | End: 2023-02-23 | Stop reason: HOSPADM

## 2023-02-23 RX ADMIN — LORAZEPAM 2 MG: 1 TABLET ORAL at 01:26

## 2023-02-23 RX ADMIN — THERA TABS 1 TABLET: TAB at 08:33

## 2023-02-23 RX ADMIN — LORAZEPAM 3 MG: 1 TABLET ORAL at 17:17

## 2023-02-23 RX ADMIN — LORAZEPAM 2 MG: 1 TABLET ORAL at 10:27

## 2023-02-23 RX ADMIN — SODIUM CHLORIDE, PRESERVATIVE FREE 10 ML: 5 INJECTION INTRAVENOUS at 08:34

## 2023-02-23 RX ADMIN — THIAMINE HYDROCHLORIDE 100 MG: 100 INJECTION, SOLUTION INTRAMUSCULAR; INTRAVENOUS at 08:33

## 2023-02-23 RX ADMIN — ENOXAPARIN SODIUM 30 MG: 100 INJECTION SUBCUTANEOUS at 08:34

## 2023-02-23 RX ADMIN — CHLORDIAZEPOXIDE HYDROCHLORIDE 10 MG: 5 CAPSULE ORAL at 14:29

## 2023-02-23 RX ADMIN — CHLORDIAZEPOXIDE HYDROCHLORIDE 25 MG: 25 CAPSULE ORAL at 08:33

## 2023-02-23 ASSESSMENT — PAIN DESCRIPTION - LOCATION: LOCATION: HEAD

## 2023-02-23 ASSESSMENT — PAIN SCALES - GENERAL
PAINLEVEL_OUTOF10: 4
PAINLEVEL_OUTOF10: 0

## 2023-02-23 ASSESSMENT — PAIN DESCRIPTION - DESCRIPTORS: DESCRIPTORS: ACHING

## 2023-02-23 NOTE — PROGRESS NOTES
Pt very anxious, tearful and stating he feels terrible. CIWA score 11. Pt's wife has left bedside, he reports they got into an argument. Prn ativan given per [facility] protocol. Pt repositioned back into bed with fresh linens, bed alarm engaged.

## 2023-02-23 NOTE — PROGRESS NOTES
Found pt suddenly standing at bedisde without clothes on, large puddle of urine on floor. Disconnected IV meds for safety. Pt cleansed; bed and floor cleansed. Pt wishes to remain on couch for now as bed uncomfortable.

## 2023-02-23 NOTE — PROGRESS NOTES
Pt turned off his bed alarm and is ambulating in room. Pt educated on fall precautions and staff assistance for ambulation.

## 2023-02-23 NOTE — PROGRESS NOTES
RN at bedside. Pt resting on couch, wife beside him. He states the bed was very uncomfortable overnight, RN adjusted pressure redistribution settings and pt states he will try the bed again. CIWA score 2 at this time, but pt states he feels \"really rough\". Emotional support provided. Due meds given. Pt & wife updated on POC, they deny questions at this time. Fall precautions reinforced, urinal remains at bedside.

## 2023-02-23 NOTE — PROGRESS NOTES
CIWA rechecked per protocol - score 5. When RN was applying blood pressure cuff, pt said, \"I need to know the protocol for leaving AMA\". RN explained AMA form. Pt is requesting to sign form and leave AMA, but he does not want to do so until [id number] because that is when his family member can pick him up. AMA form printed & placed on paper chart.

## 2023-02-23 NOTE — PROGRESS NOTES
[facility]    2/23/2023 10:15 AM        Name: Deysi Garcia .              Admitted: 2/21/2023  Primary Care Provider: No primary care provider on file.  (Tel: None)      Subjective:    Ciwa improving no chest pain fever or chills     Reviewed interval ancillary notes    Current Medications  chlordiazePOXIDE (LIBRIUM) capsule 10 mg, TID  LORazepam (ATIVAN) tablet 1 mg, Q1H PRN   Or  LORazepam (ATIVAN) 2 MG/ML concentrated solution 1 mg, Q1H PRN   Or  LORazepam (ATIVAN) tablet 2 mg, Q1H PRN   Or  LORazepam (ATIVAN) 2 MG/ML concentrated solution 2 mg, Q1H PRN   Or  LORazepam (ATIVAN) tablet 3 mg, Q1H PRN   Or  LORazepam (ATIVAN) 2 MG/ML concentrated solution 3 mg, Q1H PRN   Or  LORazepam (ATIVAN) tablet 4 mg, Q1H PRN   Or  LORazepam (ATIVAN) 2 MG/ML concentrated solution 4 mg, Q1H PRN  sodium chloride flush 0.9 % injection 5-40 mL, 2 times per day  sodium chloride flush 0.9 % injection 5-40 mL, PRN  0.9 % sodium chloride infusion, PRN  enoxaparin Sodium (LOVENOX) injection 30 mg, BID  ondansetron (ZOFRAN-ODT) disintegrating tablet 4 mg, Q8H PRN   Or  ondansetron (ZOFRAN) injection 4 mg, Q6H PRN  polyethylene glycol (GLYCOLAX) packet 17 g, Daily PRN  multivitamin 1 tablet, Daily  thiamine (B-1) injection 100 mg, Daily      Objective:  BP (!) 145/92   Pulse 86   Temp 96.9 °F (36.1 °C) (Temporal)   Resp 14   Ht 5' 10\" (1.778 m)   Wt (!) 309 lb 1.4 oz (140.2 kg)   SpO2 97%   BMI 44.35 kg/m²     Intake/Output Summary (Last 24 hours) at 2/23/2023 1015  Last data filed at 2/23/2023 0650  Gross per 24 hour   Intake 563.59 ml   Output 1075 ml   Net -511.41 ml        Wt Readings from Last 3 Encounters:   02/23/23 (!) 309 lb 1.4 oz (140.2 kg)   04/15/22 (!) 306 lb 12.8 oz (139.2 kg)   03/08/21 (!) 307 lb 11.2 oz (139.6 kg)       General appearance: lethargic  AAOx3  HEENT: atraumatic, Pupils equal, muscous membranes moist, no masses appreciated  Cardiovascular: Regular rate and rhythm no murmurs appreciated  Respiratory: CTAB no wheezing  Gastrointestinal: Abdomen soft, non-tender, BS+  EXT: no edema  Neurology: no gross focal deficts + tremors  Psychiatry: Appropriate affect. Not agitated  Skin: Warm, dry, no rashes appreciated    Labs and Tests:  CBC:   Recent Labs     02/21/23  1113 02/22/23  0426 02/23/23  0432   WBC 4.0 4.0 4.6   HGB 14.0 11.8* 11.9*   * 107* 107*       BMP:    Recent Labs     02/21/23  1114 02/22/23  0426 02/23/23  0432    133* 137   K 3.8 3.5 3.6    101 105   CO2 23 23 24   BUN 16 13 9   CREATININE <0.5* <0.5* <0.5*   GLUCOSE 130* 114* 123*       Hepatic:   Recent Labs     02/21/23  1114 02/22/23  0426 02/23/23  0432   * 130* 124*   ALT 81* 61* 64*   BILITOT 5.3* 5.1* 6.2*   ALKPHOS 205* 165* 163*       No orders to display       Recent imaging reviewed    Problem List  Principal Problem:    Acute alcohol abuse  Resolved Problems:    * No resolved hospital problems. *          Assessment/Plan:   Acute etoh withdrawal   Ciwa  Iv ativan  - iv thiamine  - wean librium    ? alcoholic hepatitis  -lfts stable gi work up started     Thrombocytopenia secondary to above ,  - monitor    Polysubstance abuse cocaine marijunan and etoh  - counseled on need to quit  - no bblockers     DVT prophylaxis lovenox  Code status full code      Jose Lane MD   2/23/2023 10:15 AM

## 2023-02-23 NOTE — PROGRESS NOTES
Gastroenterology Progress Note      Luis Gutierrez is a 40 y.o. male patient. 1. Hyperbilirubinemia    2. Transaminitis    3. Alcohol abuse    4. Agitation    5. Acute cystitis without hematuria        SUBJECTIVE:  Feels ok. Sitting up on couch eating breakfast. No abdominal pain. Some nausea. Feels anxious. Physical    VITALS:  BP (!) 145/92   Pulse 86   Temp 96.9 °F (36.1 °C) (Temporal)   Resp 14   Ht 5' 10\" (1.778 m)   Wt (!) 309 lb 1.4 oz (140.2 kg)   SpO2 97%   BMI 44.35 kg/m²   TEMPERATURE:  Current - Temp: 96.9 °F (36.1 °C); Max - Temp  Av.6 °F (36.4 °C)  Min: 96.4 °F (35.8 °C)  Max: 98.6 °F (37 °C)    Constitutional: Alert and oriented x 4. No acute distress. Respiratory: Respirations nonlabored, no crepitus  GI: Abdomen nondistended, soft, and nontender. Neurological: No focal deficits noted. No asterixis. Data    Data Review:    Recent Labs     23  1113 23  0426 23  0432   WBC 4.0 4.0 4.6   HGB 14.0 11.8* 11.9*   HCT 42.0 36.3* 37.3*   MCV 85.1 86.4 86.9   * 107* 107*     Recent Labs     23  1114 23  0426 23  0432    133* 137   K 3.8 3.5 3.6    101 105   CO2  23 24   BUN 16 13 9   CREATININE <0.5* <0.5* <0.5*     Recent Labs     23  1114 23  0426 23  0432   * 130* 124*   ALT 81* 61* 64*   BILITOT 5.3* 5.1* 6.2*   ALKPHOS 205* 165* 163*     Recent Labs     23  1114   LIPASE 63.0*   AMYLASE 56     Recent Labs     23  1113 23  1442   PROTIME 13.3 13.7   INR 1.02 1.06     No results for input(s): PTT in the last 72 hours. ASSESSMENT / PLAN      Elevated liver enzymes -pattern consistent with alcohol hepatitis. AST greater than ALT. Had imaging including ultrasound and CT about a year ago with fatty liver, hepatomegaly, no gallstones. Discriminant function is 17 so does not meet criteria for steroids. He was negative for hepatitis a and B.  Iron sat elevated at 89% which may be from alcohol but will check hemochromatosis gene test. Transaminases improved today. Bili increased a little but typically improvement in bili lags behind improvement in transaminases. Monitor liver enzymes  F/u hemochromatosis gene   Alcohol cessation. Social work consult for alcohol rehab programs. History of hepatitis C -but then had negative RNA level in 2016 with clearance of the virus. Used drugs again so repeating  RNA level.   F/u Hepatitis C RNA level    Alcohol abuse, withdrawal     Discussed with Dr. Ellis Harris, [address] [place] [address]

## 2023-02-23 NOTE — PROGRESS NOTES
Pt continues to feel anxious. CIWA score is currently 6, which does not qualify him to receive prn ativan per MAR parameters. Pt continually asking for xanax to be ordered because he does not feel anxiety relief from ativan. Provider notified of pt's requests.

## 2023-02-23 NOTE — FLOWSHEET NOTE
Pt alert and oriented. Precedex remains off. Pt received. Oral ativan throughout the day. 36hrs since last drink. Remains in seizure precautions.           02/22/23 1844   CIWA-Ar   Heart Rate (!) 102   Nausea and Vomiting 2   Tactile Disturbances 2   Tremor 3   Auditory Disturbances 0   Paroxysmal Sweats 1   Visual Disturbances 0   Anxiety 4   Headache, Fullness in Head 2   Agitation 1   Orientation and Clouding of Sensorium 0   CIWA-Ar Total (!) 15

## 2023-02-23 NOTE — PROGRESS NOTES
Pt continues to report increased anxiety. RN spoke with provider and obtained order for prn atarax. Pt initially agreeable. Scanned atarax and placed into a med cup, when pt stated, \"actually, I'm not taking that, I just want a benzo\". RN provided several minutes of education but pt continues to refuse. Atarax returned to pharmacy. CIWA score has increased. Ativan given per [facility] protocol. Pt keeps stating, \"I should have just sweat this out at home\" and \"If I can't get what I need I'll just go home\". RN provided education and support - for now, pt is agreeable to POC.

## 2023-02-23 NOTE — PLAN OF CARE
Problem: Safety - Adult  Goal: Free from fall injury  Outcome: Progressing     Problem: Discharge Planning  Goal: Discharge to home or other facility with appropriate resources  Outcome: Not Progressing  Flowsheets (Taken 2/23/2023 0249)  Discharge to home or other facility with appropriate resources: Identify barriers to discharge with patient and caregiver  Note: Pt discloses that there are problems at home and he feels that he may end up homeless

## 2023-02-23 NOTE — PROGRESS NOTES
Rec'd report from [facility] at bedside. Pt just returned from toilet. Bedrails padded. Pt pleasant, currently denies auditory or visual hallucinations. No drips in use.  Will monitor for CIWA needs throughout the night

## 2023-02-24 NOTE — PROGRESS NOTES
1948: Pt wanting to leave AMA. Pt educated on the importance of completing tx, pt still wanting to eave AMA. 1953: Dr. Payal Walsh notified of pt wanting to leave AMA. 2030: Pt left AMA along with all belongings via an uber ride.

## 2023-02-25 LAB
HCV QNT BY NAAT IU/ML: NOT DETECTED IU/ML
HCV QNT BY NAAT LOG IU/ML: NOT DETECTED LOG IU/ML
INTERPRETATION: NOT DETECTED

## 2023-03-15 ENCOUNTER — APPOINTMENT (OUTPATIENT)
Dept: CT IMAGING | Age: 38
End: 2023-03-15
Payer: COMMERCIAL

## 2023-03-15 ENCOUNTER — HOSPITAL ENCOUNTER (INPATIENT)
Age: 38
LOS: 5 days | Discharge: HOME | End: 2023-03-20
Attending: EMERGENCY MEDICINE | Admitting: INTERNAL MEDICINE
Payer: COMMERCIAL

## 2023-03-15 DIAGNOSIS — E80.6 HYPERBILIRUBINEMIA: ICD-10-CM

## 2023-03-15 DIAGNOSIS — E83.42 HYPOMAGNESEMIA: ICD-10-CM

## 2023-03-15 DIAGNOSIS — E87.6 HYPOKALEMIA: ICD-10-CM

## 2023-03-15 DIAGNOSIS — S20.219A CONTUSION OF CHEST WALL, UNSPECIFIED LATERALITY, INITIAL ENCOUNTER: ICD-10-CM

## 2023-03-15 DIAGNOSIS — R74.01 TRANSAMINITIS: ICD-10-CM

## 2023-03-15 DIAGNOSIS — F10.939 ALCOHOL WITHDRAWAL SYNDROME WITH COMPLICATION (HCC): Primary | ICD-10-CM

## 2023-03-15 PROBLEM — F10.139 ALCOHOL ABUSE WITH WITHDRAWAL, UNSPECIFIED (HCC): Status: ACTIVE | Noted: 2023-03-15

## 2023-03-15 LAB
ALBUMIN SERPL-MCNC: 3.1 G/DL (ref 3.4–5)
ALP SERPL-CCNC: 209 U/L (ref 40–129)
ALT SERPL-CCNC: 109 U/L (ref 10–40)
ANION GAP SERPL CALCULATED.3IONS-SCNC: 13 MMOL/L (ref 3–16)
ANISOCYTOSIS BLD QL SMEAR: ABNORMAL
APAP SERPL-MCNC: <5 UG/ML (ref 10–30)
APTT BLD: 27.8 SEC (ref 23–34.3)
AST SERPL-CCNC: 269 U/L (ref 15–37)
BASOPHILS # BLD: 0 K/UL (ref 0–0.2)
BASOPHILS NFR BLD: 0 %
BILIRUB DIRECT SERPL-MCNC: 8.3 MG/DL (ref 0–0.3)
BILIRUB INDIRECT SERPL-MCNC: 3.8 MG/DL (ref 0–1)
BILIRUB SERPL-MCNC: 12.1 MG/DL (ref 0–1)
BUN SERPL-MCNC: 9 MG/DL (ref 7–20)
CALCIUM SERPL-MCNC: 8.4 MG/DL (ref 8.3–10.6)
CHLORIDE SERPL-SCNC: 98 MMOL/L (ref 99–110)
CO2 SERPL-SCNC: 23 MMOL/L (ref 21–32)
CREAT SERPL-MCNC: <0.5 MG/DL (ref 0.9–1.3)
DEPRECATED RDW RBC AUTO: 19.2 % (ref 12.4–15.4)
EOSINOPHIL # BLD: 0 K/UL (ref 0–0.6)
EOSINOPHIL NFR BLD: 0 %
ETHANOLAMINE SERPL-MCNC: NORMAL MG/DL (ref 0–0.08)
GFR SERPLBLD CREATININE-BSD FMLA CKD-EPI: >60 ML/MIN/{1.73_M2}
GLUCOSE SERPL-MCNC: 135 MG/DL (ref 70–99)
HCT VFR BLD AUTO: 38.1 % (ref 40.5–52.5)
HGB BLD-MCNC: 12.4 G/DL (ref 13.5–17.5)
INR PPP: 1.09 (ref 0.87–1.14)
LACTATE BLDV-SCNC: 1.7 MMOL/L (ref 0.4–1.9)
LIPASE SERPL-CCNC: 34 U/L (ref 13–60)
LYMPHOCYTES # BLD: 0.4 K/UL (ref 1–5.1)
LYMPHOCYTES NFR BLD: 8 %
MAGNESIUM SERPL-MCNC: 1.2 MG/DL (ref 1.8–2.4)
MCH RBC QN AUTO: 29.3 PG (ref 26–34)
MCHC RBC AUTO-ENTMCNC: 32.5 G/DL (ref 31–36)
MCV RBC AUTO: 90.3 FL (ref 80–100)
MONOCYTES # BLD: 0.3 K/UL (ref 0–1.3)
MONOCYTES NFR BLD: 5 %
NEUTROPHILS # BLD: 4.8 K/UL (ref 1.7–7.7)
NEUTROPHILS NFR BLD: 87 %
NRBC BLD-RTO: 2 /100 WBC
NT-PROBNP SERPL-MCNC: 84 PG/ML (ref 0–124)
OVALOCYTES BLD QL SMEAR: ABNORMAL
PLATELET # BLD AUTO: 97 K/UL (ref 135–450)
PMV BLD AUTO: 8.9 FL (ref 5–10.5)
POLYCHROMASIA BLD QL SMEAR: ABNORMAL
POTASSIUM SERPL-SCNC: 3 MMOL/L (ref 3.5–5.1)
PROCALCITONIN SERPL IA-MCNC: 1.05 NG/ML (ref 0–0.15)
PROT SERPL-MCNC: 6.3 G/DL (ref 6.4–8.2)
PROTHROMBIN TIME: 14 SEC (ref 11.7–14.5)
RBC # BLD AUTO: 4.23 M/UL (ref 4.2–5.9)
SALICYLATES SERPL-MCNC: <0.3 MG/DL (ref 15–30)
SMUDGE CELLS BLD QL SMEAR: PRESENT
SODIUM SERPL-SCNC: 134 MMOL/L (ref 136–145)
TROPONIN T SERPL-MCNC: <0.01 NG/ML
WBC # BLD AUTO: 5.5 K/UL (ref 4–11)

## 2023-03-15 PROCEDURE — 80179 DRUG ASSAY SALICYLATE: CPT

## 2023-03-15 PROCEDURE — 6370000000 HC RX 637 (ALT 250 FOR IP): Performed by: EMERGENCY MEDICINE

## 2023-03-15 PROCEDURE — 80307 DRUG TEST PRSMV CHEM ANLYZR: CPT

## 2023-03-15 PROCEDURE — 85610 PROTHROMBIN TIME: CPT

## 2023-03-15 PROCEDURE — 96366 THER/PROPH/DIAG IV INF ADDON: CPT

## 2023-03-15 PROCEDURE — 80048 BASIC METABOLIC PNL TOTAL CA: CPT

## 2023-03-15 PROCEDURE — 85025 COMPLETE CBC W/AUTO DIFF WBC: CPT

## 2023-03-15 PROCEDURE — 2500000003 HC RX 250 WO HCPCS: Performed by: PHYSICIAN ASSISTANT

## 2023-03-15 PROCEDURE — 70450 CT HEAD/BRAIN W/O DYE: CPT

## 2023-03-15 PROCEDURE — 84484 ASSAY OF TROPONIN QUANT: CPT

## 2023-03-15 PROCEDURE — 83880 ASSAY OF NATRIURETIC PEPTIDE: CPT

## 2023-03-15 PROCEDURE — 83690 ASSAY OF LIPASE: CPT

## 2023-03-15 PROCEDURE — 83605 ASSAY OF LACTIC ACID: CPT

## 2023-03-15 PROCEDURE — 80143 DRUG ASSAY ACETAMINOPHEN: CPT

## 2023-03-15 PROCEDURE — 6360000002 HC RX W HCPCS: Performed by: EMERGENCY MEDICINE

## 2023-03-15 PROCEDURE — 6360000004 HC RX CONTRAST MEDICATION: Performed by: PHYSICIAN ASSISTANT

## 2023-03-15 PROCEDURE — 2060000000 HC ICU INTERMEDIATE R&B

## 2023-03-15 PROCEDURE — 82077 ASSAY SPEC XCP UR&BREATH IA: CPT

## 2023-03-15 PROCEDURE — 83735 ASSAY OF MAGNESIUM: CPT

## 2023-03-15 PROCEDURE — 6370000000 HC RX 637 (ALT 250 FOR IP): Performed by: PHYSICIAN ASSISTANT

## 2023-03-15 PROCEDURE — 85730 THROMBOPLASTIN TIME PARTIAL: CPT

## 2023-03-15 PROCEDURE — 72125 CT NECK SPINE W/O DYE: CPT

## 2023-03-15 PROCEDURE — 2580000003 HC RX 258: Performed by: INTERNAL MEDICINE

## 2023-03-15 PROCEDURE — 2580000003 HC RX 258: Performed by: PHYSICIAN ASSISTANT

## 2023-03-15 PROCEDURE — 81001 URINALYSIS AUTO W/SCOPE: CPT

## 2023-03-15 PROCEDURE — 84145 PROCALCITONIN (PCT): CPT

## 2023-03-15 PROCEDURE — 6360000002 HC RX W HCPCS: Performed by: PHYSICIAN ASSISTANT

## 2023-03-15 PROCEDURE — 96376 TX/PRO/DX INJ SAME DRUG ADON: CPT

## 2023-03-15 PROCEDURE — 96375 TX/PRO/DX INJ NEW DRUG ADDON: CPT

## 2023-03-15 PROCEDURE — 99285 EMERGENCY DEPT VISIT HI MDM: CPT

## 2023-03-15 PROCEDURE — 71260 CT THORAX DX C+: CPT

## 2023-03-15 PROCEDURE — 96365 THER/PROPH/DIAG IV INF INIT: CPT

## 2023-03-15 PROCEDURE — 74177 CT ABD & PELVIS W/CONTRAST: CPT

## 2023-03-15 PROCEDURE — 93005 ELECTROCARDIOGRAM TRACING: CPT | Performed by: PHYSICIAN ASSISTANT

## 2023-03-15 PROCEDURE — 80076 HEPATIC FUNCTION PANEL: CPT

## 2023-03-15 PROCEDURE — 96368 THER/DIAG CONCURRENT INF: CPT

## 2023-03-15 RX ORDER — MAGNESIUM SULFATE IN WATER 40 MG/ML
2000 INJECTION, SOLUTION INTRAVENOUS ONCE
Status: COMPLETED | OUTPATIENT
Start: 2023-03-15 | End: 2023-03-15

## 2023-03-15 RX ORDER — LORAZEPAM 2 MG/ML
2 INJECTION INTRAMUSCULAR
Status: DISCONTINUED | OUTPATIENT
Start: 2023-03-15 | End: 2023-03-20 | Stop reason: HOSPADM

## 2023-03-15 RX ORDER — LORAZEPAM 1 MG/1
4 TABLET ORAL
Status: DISCONTINUED | OUTPATIENT
Start: 2023-03-15 | End: 2023-03-20 | Stop reason: HOSPADM

## 2023-03-15 RX ORDER — GAUZE BANDAGE 2" X 2"
100 BANDAGE TOPICAL DAILY
Status: DISCONTINUED | OUTPATIENT
Start: 2023-03-15 | End: 2023-03-15 | Stop reason: HOSPADM

## 2023-03-15 RX ORDER — POLYETHYLENE GLYCOL 3350 17 G/17G
17 POWDER, FOR SOLUTION ORAL DAILY PRN
Status: DISCONTINUED | OUTPATIENT
Start: 2023-03-15 | End: 2023-03-20 | Stop reason: HOSPADM

## 2023-03-15 RX ORDER — SODIUM CHLORIDE 9 MG/ML
INJECTION, SOLUTION INTRAVENOUS PRN
Status: DISCONTINUED | OUTPATIENT
Start: 2023-03-15 | End: 2023-03-15 | Stop reason: SDUPTHER

## 2023-03-15 RX ORDER — GAUZE BANDAGE 2" X 2"
100 BANDAGE TOPICAL DAILY
Status: DISCONTINUED | OUTPATIENT
Start: 2023-03-16 | End: 2023-03-16

## 2023-03-15 RX ORDER — LORAZEPAM 2 MG/ML
1 INJECTION INTRAMUSCULAR
Status: DISCONTINUED | OUTPATIENT
Start: 2023-03-15 | End: 2023-03-20 | Stop reason: HOSPADM

## 2023-03-15 RX ORDER — LORAZEPAM 1 MG/1
3 TABLET ORAL
Status: DISCONTINUED | OUTPATIENT
Start: 2023-03-15 | End: 2023-03-15 | Stop reason: HOSPADM

## 2023-03-15 RX ORDER — ENOXAPARIN SODIUM 100 MG/ML
40 INJECTION SUBCUTANEOUS DAILY
Status: DISCONTINUED | OUTPATIENT
Start: 2023-03-16 | End: 2023-03-20 | Stop reason: HOSPADM

## 2023-03-15 RX ORDER — ACETAMINOPHEN 325 MG/1
650 TABLET ORAL EVERY 6 HOURS PRN
Status: DISCONTINUED | OUTPATIENT
Start: 2023-03-15 | End: 2023-03-20 | Stop reason: HOSPADM

## 2023-03-15 RX ORDER — SODIUM CHLORIDE 0.9 % (FLUSH) 0.9 %
5-40 SYRINGE (ML) INJECTION EVERY 12 HOURS SCHEDULED
Status: DISCONTINUED | OUTPATIENT
Start: 2023-03-15 | End: 2023-03-20 | Stop reason: HOSPADM

## 2023-03-15 RX ORDER — ACETAMINOPHEN 650 MG/1
650 SUPPOSITORY RECTAL EVERY 6 HOURS PRN
Status: DISCONTINUED | OUTPATIENT
Start: 2023-03-15 | End: 2023-03-20 | Stop reason: HOSPADM

## 2023-03-15 RX ORDER — LORAZEPAM 2 MG/ML
4 INJECTION INTRAMUSCULAR
Status: DISCONTINUED | OUTPATIENT
Start: 2023-03-15 | End: 2023-03-20 | Stop reason: HOSPADM

## 2023-03-15 RX ORDER — SODIUM CHLORIDE 0.9 % (FLUSH) 0.9 %
5-40 SYRINGE (ML) INJECTION PRN
Status: DISCONTINUED | OUTPATIENT
Start: 2023-03-15 | End: 2023-03-20 | Stop reason: HOSPADM

## 2023-03-15 RX ORDER — POTASSIUM CHLORIDE 7.45 MG/ML
10 INJECTION INTRAVENOUS ONCE
Status: COMPLETED | OUTPATIENT
Start: 2023-03-15 | End: 2023-03-15

## 2023-03-15 RX ORDER — LORAZEPAM 1 MG/1
2 TABLET ORAL
Status: DISCONTINUED | OUTPATIENT
Start: 2023-03-15 | End: 2023-03-15 | Stop reason: HOSPADM

## 2023-03-15 RX ORDER — LORAZEPAM 1 MG/1
1 TABLET ORAL
Status: DISCONTINUED | OUTPATIENT
Start: 2023-03-15 | End: 2023-03-15 | Stop reason: HOSPADM

## 2023-03-15 RX ORDER — LORAZEPAM 2 MG/ML
3 INJECTION INTRAMUSCULAR
Status: DISCONTINUED | OUTPATIENT
Start: 2023-03-15 | End: 2023-03-20 | Stop reason: HOSPADM

## 2023-03-15 RX ORDER — ONDANSETRON 2 MG/ML
4 INJECTION INTRAMUSCULAR; INTRAVENOUS EVERY 6 HOURS PRN
Status: DISCONTINUED | OUTPATIENT
Start: 2023-03-15 | End: 2023-03-20 | Stop reason: HOSPADM

## 2023-03-15 RX ORDER — LORAZEPAM 2 MG/ML
4 INJECTION INTRAMUSCULAR
Status: DISCONTINUED | OUTPATIENT
Start: 2023-03-15 | End: 2023-03-15 | Stop reason: HOSPADM

## 2023-03-15 RX ORDER — LORAZEPAM 1 MG/1
3 TABLET ORAL
Status: DISCONTINUED | OUTPATIENT
Start: 2023-03-15 | End: 2023-03-20 | Stop reason: HOSPADM

## 2023-03-15 RX ORDER — POTASSIUM CHLORIDE 750 MG/1
40 TABLET, FILM COATED, EXTENDED RELEASE ORAL ONCE
Status: COMPLETED | OUTPATIENT
Start: 2023-03-15 | End: 2023-03-15

## 2023-03-15 RX ORDER — ONDANSETRON 4 MG/1
4 TABLET, ORALLY DISINTEGRATING ORAL EVERY 8 HOURS PRN
Status: DISCONTINUED | OUTPATIENT
Start: 2023-03-15 | End: 2023-03-20 | Stop reason: HOSPADM

## 2023-03-15 RX ORDER — LORAZEPAM 2 MG/ML
2 INJECTION INTRAMUSCULAR
Status: DISCONTINUED | OUTPATIENT
Start: 2023-03-15 | End: 2023-03-15 | Stop reason: HOSPADM

## 2023-03-15 RX ORDER — LORAZEPAM 2 MG/ML
3 INJECTION INTRAMUSCULAR
Status: DISCONTINUED | OUTPATIENT
Start: 2023-03-15 | End: 2023-03-15 | Stop reason: HOSPADM

## 2023-03-15 RX ORDER — ONDANSETRON 2 MG/ML
4 INJECTION INTRAMUSCULAR; INTRAVENOUS EVERY 6 HOURS PRN
Status: DISCONTINUED | OUTPATIENT
Start: 2023-03-15 | End: 2023-03-15 | Stop reason: SDUPTHER

## 2023-03-15 RX ORDER — LORAZEPAM 1 MG/1
2 TABLET ORAL
Status: DISCONTINUED | OUTPATIENT
Start: 2023-03-15 | End: 2023-03-20 | Stop reason: HOSPADM

## 2023-03-15 RX ORDER — SODIUM CHLORIDE 0.9 % (FLUSH) 0.9 %
5-40 SYRINGE (ML) INJECTION EVERY 12 HOURS SCHEDULED
Status: DISCONTINUED | OUTPATIENT
Start: 2023-03-16 | End: 2023-03-15 | Stop reason: SDUPTHER

## 2023-03-15 RX ORDER — SODIUM CHLORIDE 0.9 % (FLUSH) 0.9 %
5-40 SYRINGE (ML) INJECTION PRN
Status: DISCONTINUED | OUTPATIENT
Start: 2023-03-15 | End: 2023-03-15 | Stop reason: SDUPTHER

## 2023-03-15 RX ORDER — LORAZEPAM 1 MG/1
1 TABLET ORAL
Status: DISCONTINUED | OUTPATIENT
Start: 2023-03-15 | End: 2023-03-20 | Stop reason: HOSPADM

## 2023-03-15 RX ORDER — SODIUM CHLORIDE 0.9 % (FLUSH) 0.9 %
5-40 SYRINGE (ML) INJECTION EVERY 12 HOURS SCHEDULED
Status: DISCONTINUED | OUTPATIENT
Start: 2023-03-15 | End: 2023-03-15 | Stop reason: SDUPTHER

## 2023-03-15 RX ORDER — MORPHINE SULFATE 4 MG/ML
4 INJECTION, SOLUTION INTRAMUSCULAR; INTRAVENOUS
Status: DISCONTINUED | OUTPATIENT
Start: 2023-03-15 | End: 2023-03-15 | Stop reason: HOSPADM

## 2023-03-15 RX ORDER — LORAZEPAM 1 MG/1
4 TABLET ORAL
Status: DISCONTINUED | OUTPATIENT
Start: 2023-03-15 | End: 2023-03-15 | Stop reason: HOSPADM

## 2023-03-15 RX ORDER — SODIUM CHLORIDE 9 MG/ML
INJECTION, SOLUTION INTRAVENOUS PRN
Status: DISCONTINUED | OUTPATIENT
Start: 2023-03-15 | End: 2023-03-20 | Stop reason: HOSPADM

## 2023-03-15 RX ORDER — LORAZEPAM 2 MG/ML
1 INJECTION INTRAMUSCULAR
Status: DISCONTINUED | OUTPATIENT
Start: 2023-03-15 | End: 2023-03-15 | Stop reason: HOSPADM

## 2023-03-15 RX ADMIN — FOLIC ACID: 5 INJECTION, SOLUTION INTRAMUSCULAR; INTRAVENOUS; SUBCUTANEOUS at 20:44

## 2023-03-15 RX ADMIN — MAGNESIUM SULFATE HEPTAHYDRATE 2000 MG: 40 INJECTION, SOLUTION INTRAVENOUS at 20:45

## 2023-03-15 RX ADMIN — POTASSIUM CHLORIDE 10 MEQ: 7.46 INJECTION, SOLUTION INTRAVENOUS at 20:45

## 2023-03-15 RX ADMIN — LORAZEPAM 1 MG: 2 INJECTION INTRAMUSCULAR; INTRAVENOUS at 21:53

## 2023-03-15 RX ADMIN — LORAZEPAM 2 MG: 2 INJECTION INTRAMUSCULAR; INTRAVENOUS at 20:49

## 2023-03-15 RX ADMIN — Medication 10 ML: at 23:34

## 2023-03-15 RX ADMIN — THIAMINE HCL TAB 100 MG 100 MG: 100 TAB at 20:46

## 2023-03-15 RX ADMIN — IOPAMIDOL 75 ML: 755 INJECTION, SOLUTION INTRAVENOUS at 20:29

## 2023-03-15 RX ADMIN — MORPHINE SULFATE 4 MG: 4 INJECTION, SOLUTION INTRAMUSCULAR; INTRAVENOUS at 20:49

## 2023-03-15 RX ADMIN — POTASSIUM CHLORIDE 40 MEQ: 750 TABLET, FILM COATED, EXTENDED RELEASE ORAL at 20:46

## 2023-03-15 ASSESSMENT — PAIN DESCRIPTION - LOCATION: LOCATION: ABDOMEN

## 2023-03-15 ASSESSMENT — PAIN DESCRIPTION - DESCRIPTORS: DESCRIPTORS: ACHING;DISCOMFORT

## 2023-03-15 ASSESSMENT — PAIN DESCRIPTION - FREQUENCY: FREQUENCY: INTERMITTENT

## 2023-03-15 ASSESSMENT — PAIN SCALES - GENERAL
PAINLEVEL_OUTOF10: 3
PAINLEVEL_OUTOF10: 6
PAINLEVEL_OUTOF10: 3
PAINLEVEL_OUTOF10: 6
PAINLEVEL_OUTOF10: 0
PAINLEVEL_OUTOF10: 6

## 2023-03-15 ASSESSMENT — ENCOUNTER SYMPTOMS
NAUSEA: 1
DIARRHEA: 0
SHORTNESS OF BREATH: 0
COLOR CHANGE: 1
COUGH: 0
ABDOMINAL PAIN: 1
VOMITING: 0
RHINORRHEA: 0

## 2023-03-15 ASSESSMENT — LIFESTYLE VARIABLES
HOW MANY STANDARD DRINKS CONTAINING ALCOHOL DO YOU HAVE ON A TYPICAL DAY: 5 OR 6
HOW OFTEN DO YOU HAVE A DRINK CONTAINING ALCOHOL: 4 OR MORE TIMES A WEEK

## 2023-03-15 ASSESSMENT — PAIN DESCRIPTION - ONSET: ONSET: ON-GOING

## 2023-03-15 ASSESSMENT — PAIN - FUNCTIONAL ASSESSMENT: PAIN_FUNCTIONAL_ASSESSMENT: ACTIVITIES ARE NOT PREVENTED

## 2023-03-15 ASSESSMENT — PAIN DESCRIPTION - PAIN TYPE: TYPE: ACUTE PAIN

## 2023-03-15 ASSESSMENT — PAIN DESCRIPTION - ORIENTATION: ORIENTATION: MID

## 2023-03-16 LAB
ALBUMIN SERPL-MCNC: 2.8 G/DL (ref 3.4–5)
ALP SERPL-CCNC: 201 U/L (ref 40–129)
ALT SERPL-CCNC: 88 U/L (ref 10–40)
AMPHETAMINES UR QL SCN>1000 NG/ML: ABNORMAL
ANION GAP SERPL CALCULATED.3IONS-SCNC: 10 MMOL/L (ref 3–16)
AST SERPL-CCNC: 230 U/L (ref 15–37)
BACTERIA URNS QL MICRO: ABNORMAL /HPF
BARBITURATES UR QL SCN>200 NG/ML: ABNORMAL
BENZODIAZ UR QL SCN>200 NG/ML: POSITIVE
BILIRUB DIRECT SERPL-MCNC: 9.2 MG/DL (ref 0–0.3)
BILIRUB INDIRECT SERPL-MCNC: 2.6 MG/DL (ref 0–1)
BILIRUB SERPL-MCNC: 11.8 MG/DL (ref 0–1)
BILIRUB UR QL STRIP.AUTO: ABNORMAL
BUN SERPL-MCNC: 9 MG/DL (ref 7–20)
CALCIUM SERPL-MCNC: 8.1 MG/DL (ref 8.3–10.6)
CANNABINOIDS UR QL SCN>50 NG/ML: ABNORMAL
CHLORIDE SERPL-SCNC: 99 MMOL/L (ref 99–110)
CLARITY UR: CLEAR
CO2 SERPL-SCNC: 24 MMOL/L (ref 21–32)
COCAINE UR QL SCN: ABNORMAL
COLOR UR: ABNORMAL
CREAT SERPL-MCNC: <0.5 MG/DL (ref 0.9–1.3)
DEPRECATED RDW RBC AUTO: 19.7 % (ref 12.4–15.4)
DRUG SCREEN COMMENT UR-IMP: ABNORMAL
EKG ATRIAL RATE: 93 BPM
EKG DIAGNOSIS: NORMAL
EKG P AXIS: 38 DEGREES
EKG P-R INTERVAL: 120 MS
EKG Q-T INTERVAL: 364 MS
EKG QRS DURATION: 94 MS
EKG QTC CALCULATION (BAZETT): 452 MS
EKG R AXIS: 20 DEGREES
EKG T AXIS: 39 DEGREES
EKG VENTRICULAR RATE: 93 BPM
EPI CELLS #/AREA URNS AUTO: 1 /HPF (ref 0–5)
FENTANYL SCREEN, URINE: ABNORMAL
GFR SERPLBLD CREATININE-BSD FMLA CKD-EPI: >60 ML/MIN/{1.73_M2}
GLUCOSE SERPL-MCNC: 114 MG/DL (ref 70–99)
GLUCOSE UR STRIP.AUTO-MCNC: ABNORMAL MG/DL
HCT VFR BLD AUTO: 33.9 % (ref 40.5–52.5)
HGB BLD-MCNC: 11.3 G/DL (ref 13.5–17.5)
HGB UR QL STRIP.AUTO: ABNORMAL
HYALINE CASTS #/AREA URNS LPF: ABNORMAL /LPF (ref 0–2)
KETONES UR STRIP.AUTO-MCNC: ABNORMAL MG/DL
LEUKOCYTE ESTERASE UR QL STRIP.AUTO: ABNORMAL
MAGNESIUM SERPL-MCNC: 2 MG/DL (ref 1.8–2.4)
MCH RBC QN AUTO: 29.8 PG (ref 26–34)
MCHC RBC AUTO-ENTMCNC: 33.3 G/DL (ref 31–36)
MCV RBC AUTO: 89.6 FL (ref 80–100)
METHADONE UR QL SCN>300 NG/ML: ABNORMAL
NITRITE UR QL STRIP.AUTO: ABNORMAL
OPIATES UR QL SCN>300 NG/ML: POSITIVE
OXYCODONE UR QL SCN: ABNORMAL
PCP UR QL SCN>25 NG/ML: ABNORMAL
PH UR STRIP.AUTO: ABNORMAL [PH] (ref 5–8)
PH UR STRIP: ABNORMAL [PH]
PHOSPHATE SERPL-MCNC: 2.4 MG/DL (ref 2.5–4.9)
PLATELET # BLD AUTO: 86 K/UL (ref 135–450)
PMV BLD AUTO: 8.9 FL (ref 5–10.5)
POTASSIUM SERPL-SCNC: 3.3 MMOL/L (ref 3.5–5.1)
PROT SERPL-MCNC: 5.8 G/DL (ref 6.4–8.2)
PROT UR STRIP.AUTO-MCNC: ABNORMAL MG/DL
RBC # BLD AUTO: 3.78 M/UL (ref 4.2–5.9)
RBC #/AREA URNS HPF: ABNORMAL /HPF (ref 0–4)
SODIUM SERPL-SCNC: 133 MMOL/L (ref 136–145)
SP GR UR STRIP.AUTO: ABNORMAL (ref 1–1.03)
UA COMPLETE W REFLEX CULTURE PNL UR: ABNORMAL
UA DIPSTICK W REFLEX MICRO PNL UR: YES
URN SPEC COLLECT METH UR: ABNORMAL
UROBILINOGEN UR STRIP-ACNC: ABNORMAL E.U./DL
WBC # BLD AUTO: 5.7 K/UL (ref 4–11)
WBC #/AREA URNS AUTO: 1 /HPF (ref 0–5)

## 2023-03-16 PROCEDURE — 2500000003 HC RX 250 WO HCPCS: Performed by: INTERNAL MEDICINE

## 2023-03-16 PROCEDURE — 6360000002 HC RX W HCPCS: Performed by: INTERNAL MEDICINE

## 2023-03-16 PROCEDURE — 6360000002 HC RX W HCPCS: Performed by: NURSE PRACTITIONER

## 2023-03-16 PROCEDURE — 2060000000 HC ICU INTERMEDIATE R&B

## 2023-03-16 PROCEDURE — 6370000000 HC RX 637 (ALT 250 FOR IP): Performed by: INTERNAL MEDICINE

## 2023-03-16 PROCEDURE — 2580000003 HC RX 258: Performed by: INTERNAL MEDICINE

## 2023-03-16 PROCEDURE — 85027 COMPLETE CBC AUTOMATED: CPT

## 2023-03-16 PROCEDURE — 80048 BASIC METABOLIC PNL TOTAL CA: CPT

## 2023-03-16 PROCEDURE — 6370000000 HC RX 637 (ALT 250 FOR IP): Performed by: NURSE PRACTITIONER

## 2023-03-16 PROCEDURE — 93010 ELECTROCARDIOGRAM REPORT: CPT | Performed by: INTERNAL MEDICINE

## 2023-03-16 PROCEDURE — 84100 ASSAY OF PHOSPHORUS: CPT

## 2023-03-16 PROCEDURE — 83735 ASSAY OF MAGNESIUM: CPT

## 2023-03-16 PROCEDURE — 81256 HFE GENE: CPT

## 2023-03-16 PROCEDURE — 36415 COLL VENOUS BLD VENIPUNCTURE: CPT

## 2023-03-16 PROCEDURE — 80076 HEPATIC FUNCTION PANEL: CPT

## 2023-03-16 RX ORDER — GAUZE BANDAGE 2" X 2"
100 BANDAGE TOPICAL DAILY
Status: DISCONTINUED | OUTPATIENT
Start: 2023-03-16 | End: 2023-03-20 | Stop reason: HOSPADM

## 2023-03-16 RX ORDER — POTASSIUM CHLORIDE 750 MG/1
40 TABLET, FILM COATED, EXTENDED RELEASE ORAL
Status: COMPLETED | OUTPATIENT
Start: 2023-03-16 | End: 2023-03-17

## 2023-03-16 RX ORDER — MAGNESIUM SULFATE IN WATER 40 MG/ML
2000 INJECTION, SOLUTION INTRAVENOUS ONCE
Status: DISCONTINUED | OUTPATIENT
Start: 2023-03-16 | End: 2023-03-16

## 2023-03-16 RX ORDER — POTASSIUM CHLORIDE 7.45 MG/ML
10 INJECTION INTRAVENOUS ONCE
Status: DISCONTINUED | OUTPATIENT
Start: 2023-03-16 | End: 2023-03-16

## 2023-03-16 RX ORDER — MULTIVITAMIN WITH IRON
1 TABLET ORAL DAILY
Status: DISCONTINUED | OUTPATIENT
Start: 2023-03-16 | End: 2023-03-20 | Stop reason: HOSPADM

## 2023-03-16 RX ADMIN — Medication 10 ML: at 20:09

## 2023-03-16 RX ADMIN — ENOXAPARIN SODIUM 40 MG: 100 INJECTION SUBCUTANEOUS at 09:30

## 2023-03-16 RX ADMIN — LORAZEPAM 2 MG: 1 TABLET ORAL at 09:31

## 2023-03-16 RX ADMIN — SODIUM CHLORIDE: 9 INJECTION, SOLUTION INTRAVENOUS at 10:44

## 2023-03-16 RX ADMIN — THIAMINE HCL TAB 100 MG 100 MG: 100 TAB at 09:28

## 2023-03-16 RX ADMIN — LORAZEPAM 1 MG: 1 TABLET ORAL at 04:34

## 2023-03-16 RX ADMIN — ACETAMINOPHEN 650 MG: 325 TABLET ORAL at 12:12

## 2023-03-16 RX ADMIN — ONDANSETRON 4 MG: 4 TABLET, ORALLY DISINTEGRATING ORAL at 09:32

## 2023-03-16 RX ADMIN — FOLIC ACID: 5 INJECTION, SOLUTION INTRAMUSCULAR; INTRAVENOUS; SUBCUTANEOUS at 10:47

## 2023-03-16 RX ADMIN — ACETAMINOPHEN 650 MG: 325 TABLET ORAL at 04:34

## 2023-03-16 RX ADMIN — Medication 10 ML: at 10:00

## 2023-03-16 RX ADMIN — POTASSIUM CHLORIDE 40 MEQ: 750 TABLET, FILM COATED, EXTENDED RELEASE ORAL at 09:29

## 2023-03-16 RX ADMIN — LORAZEPAM 2 MG: 2 INJECTION INTRAMUSCULAR; INTRAVENOUS at 01:04

## 2023-03-16 RX ADMIN — ACETAMINOPHEN 650 MG: 325 TABLET ORAL at 23:52

## 2023-03-16 RX ADMIN — LORAZEPAM 3 MG: 1 TABLET ORAL at 12:58

## 2023-03-16 RX ADMIN — THERA TABS 1 TABLET: TAB at 09:33

## 2023-03-16 ASSESSMENT — PAIN DESCRIPTION - LOCATION
LOCATION: HEAD
LOCATION: BACK
LOCATION: BACK
LOCATION: BACK;HEAD;ABDOMEN
LOCATION: HEAD

## 2023-03-16 ASSESSMENT — PAIN DESCRIPTION - DESCRIPTORS
DESCRIPTORS: SHARP
DESCRIPTORS: ACHING

## 2023-03-16 ASSESSMENT — PAIN SCALES - GENERAL
PAINLEVEL_OUTOF10: 4
PAINLEVEL_OUTOF10: 3
PAINLEVEL_OUTOF10: 4
PAINLEVEL_OUTOF10: 0
PAINLEVEL_OUTOF10: 4
PAINLEVEL_OUTOF10: 4

## 2023-03-16 ASSESSMENT — PAIN DESCRIPTION - ONSET: ONSET: ON-GOING

## 2023-03-16 ASSESSMENT — PAIN DESCRIPTION - ORIENTATION
ORIENTATION: MID
ORIENTATION: LEFT

## 2023-03-16 ASSESSMENT — PAIN - FUNCTIONAL ASSESSMENT: PAIN_FUNCTIONAL_ASSESSMENT: ACTIVITIES ARE NOT PREVENTED

## 2023-03-16 ASSESSMENT — PAIN DESCRIPTION - FREQUENCY: FREQUENCY: INTERMITTENT

## 2023-03-16 ASSESSMENT — PAIN DESCRIPTION - PAIN TYPE: TYPE: ACUTE PAIN

## 2023-03-16 NOTE — CONSULTS
for input(s): PTT in the last 72 hours. No results for input(s): OCCULTBLD in the last 72 hours. Imaging Studies:                             CT-scan of chest abdomen and pelvis w iv contrast 3/15/23:  Chest:       Mediastinum: No acute aortic abnormality identified. No mediastinal   hematoma. No pericardial effusion. Lungs/pleura: No acute airspace disease, pneumothorax or effusion. Soft Tissues/Bones: No acute osseous abnormality identified in this region. Smoothly marginated defect in the costochondral portion of the left 10th rib. No soft tissue hematoma. Abdomen/Pelvis:       Organs: Hepatomegaly with marked hepatic steatosis. No inflammatory change   identified in the gallbladder fossa. The pancreas, spleen, adrenals and   kidneys reveal no acute findings. GI/Bowel: There is no bowel dilatation or wall thickening identified. Pelvis: No acute findings. Appropriate excretion of contrast is demonstrated   into the bladder. Peritoneum/Retroperitoneum: No free air. No free fluid. The aorta is normal   in caliber. The visceral branches are patent. Bones/Soft Tissues: No acute osseous abnormality identified. L5 pars defects   without listhesis. Impression   1. No acute traumatic injury identified. 2.  Hepatomegaly with marked hepatic steatosis. Assessment/Plan:     Alcohol hepatitis -recurrent. Bilirubin significantly higher than a few weeks ago. INR is normal.  Discriminant function is 11.8 so does not meet criteria for steroids. CT with hepatomegaly with marked steatosis and no biliary dilation. Last ultrasound from 4/2022 without any gallstones. Acute Hep B labs neg 2/21/23.   -Monitor liver enzymes  -f/u hemochromatosis gene test (high iron sat in the past)  -Discussed with patient the importance of complete alcohol cessation and high risk of mortality with continued alcohol abuse.   Social work consult for

## 2023-03-16 NOTE — CARE COORDINATION
03/16/23 1135   Readmission Assessment   Number of Days since last admission? 8-30 days   Previous Disposition Home with Family   Who is being Interviewed Patient   What was the patient's/caregiver's perception as to why they think they needed to return back to the hospital? Other (Comment)  (Patient left AMA last time and did not finish detoxing. Patient advised he is still sick and wants to full detox this time.)   Did you visit your Primary Care Physician after you left the hospital, before you returned this time? No   Why weren't you able to visit your PCP? Did not have an appointment   Did you see a specialist, such as Cardiac, Pulmonary, Orthopedic Physician, etc. after you left the hospital? No   Who advised the patient to return to the hospital? Self-referral   Does the patient report anything that got in the way of taking their medications? No   In our efforts to provide the best possible care to you and others like you, can you think of anything that we could have done to help you after you left the hospital the first time, so that you might not have needed to return so soon? Other (Comment)  (Patient left AMA last time and did not finish detoxing.  Patient advised he is still sick and wants to full detox this time.)       Electronically signed by DEEPA Smith on 3/16/2023 at 11:35 AM

## 2023-03-16 NOTE — ED NOTES
Report called to 3T RN, nothing infusing upon transfer, bedside monitor in use, belongings with pt, seizure pads remain on stretcher      Saira Ramirez, RN  03/15/23 686 [facility], RN  03/15/23 3511
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
## 2023-03-16 NOTE — CARE COORDINATION
Discharge Planning:     (CM) spoke to the Patient in regards to his alcohol consumption. He advised he has been drinking for years. He advised he is here and hoping to fully detox this time. He is aware of substance abuse resources and declined any from CM. 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 Patient advised he left AMA last time and is planning to stay this time and fully detox. CM team to follow for any needs.     Electronically signed by DEEPA Flynn on 3/16/2023 at 11:37 AM

## 2023-03-16 NOTE — ED NOTES
Seizure pads in place, bed in low position, cardiac monitor in use     Roberto Rodriguez RN  03/15/23 0735
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
## 2023-03-17 LAB
ALBUMIN SERPL-MCNC: 2.9 G/DL (ref 3.4–5)
ALP SERPL-CCNC: 204 U/L (ref 40–129)
ALT SERPL-CCNC: 93 U/L (ref 10–40)
ANION GAP SERPL CALCULATED.3IONS-SCNC: 10 MMOL/L (ref 3–16)
AST SERPL-CCNC: 263 U/L (ref 15–37)
BILIRUB DIRECT SERPL-MCNC: >10 MG/DL (ref 0–0.3)
BILIRUB INDIRECT SERPL-MCNC: ABNORMAL MG/DL (ref 0–1)
BILIRUB SERPL-MCNC: 13.2 MG/DL (ref 0–1)
BUN SERPL-MCNC: 6 MG/DL (ref 7–20)
CALCIUM SERPL-MCNC: 8.5 MG/DL (ref 8.3–10.6)
CHLORIDE SERPL-SCNC: 101 MMOL/L (ref 99–110)
CO2 SERPL-SCNC: 24 MMOL/L (ref 21–32)
CREAT SERPL-MCNC: <0.5 MG/DL (ref 0.9–1.3)
DEPRECATED RDW RBC AUTO: 19.8 % (ref 12.4–15.4)
GFR SERPLBLD CREATININE-BSD FMLA CKD-EPI: >60 ML/MIN/{1.73_M2}
GLUCOSE SERPL-MCNC: 101 MG/DL (ref 70–99)
HCT VFR BLD AUTO: 33.7 % (ref 40.5–52.5)
HGB BLD-MCNC: 11.4 G/DL (ref 13.5–17.5)
INR PPP: 1.06 (ref 0.87–1.14)
MAGNESIUM SERPL-MCNC: 2.1 MG/DL (ref 1.8–2.4)
MCH RBC QN AUTO: 30.2 PG (ref 26–34)
MCHC RBC AUTO-ENTMCNC: 33.7 G/DL (ref 31–36)
MCV RBC AUTO: 89.4 FL (ref 80–100)
PHOSPHATE SERPL-MCNC: 2.2 MG/DL (ref 2.5–4.9)
PLATELET # BLD AUTO: 109 K/UL (ref 135–450)
PLATELET BLD QL SMEAR: ABNORMAL
PMV BLD AUTO: 8.9 FL (ref 5–10.5)
POTASSIUM SERPL-SCNC: 3.7 MMOL/L (ref 3.5–5.1)
PROT SERPL-MCNC: 6.1 G/DL (ref 6.4–8.2)
PROTHROMBIN TIME: 13.7 SEC (ref 11.7–14.5)
RBC # BLD AUTO: 3.77 M/UL (ref 4.2–5.9)
SLIDE REVIEW: ABNORMAL
SODIUM SERPL-SCNC: 135 MMOL/L (ref 136–145)
WBC # BLD AUTO: 4.4 K/UL (ref 4–11)

## 2023-03-17 PROCEDURE — 6370000000 HC RX 637 (ALT 250 FOR IP): Performed by: NURSE PRACTITIONER

## 2023-03-17 PROCEDURE — 80076 HEPATIC FUNCTION PANEL: CPT

## 2023-03-17 PROCEDURE — 94760 N-INVAS EAR/PLS OXIMETRY 1: CPT

## 2023-03-17 PROCEDURE — 36415 COLL VENOUS BLD VENIPUNCTURE: CPT

## 2023-03-17 PROCEDURE — 80048 BASIC METABOLIC PNL TOTAL CA: CPT

## 2023-03-17 PROCEDURE — 85610 PROTHROMBIN TIME: CPT

## 2023-03-17 PROCEDURE — 6360000002 HC RX W HCPCS: Performed by: INTERNAL MEDICINE

## 2023-03-17 PROCEDURE — 2500000003 HC RX 250 WO HCPCS: Performed by: INTERNAL MEDICINE

## 2023-03-17 PROCEDURE — 6370000000 HC RX 637 (ALT 250 FOR IP): Performed by: INTERNAL MEDICINE

## 2023-03-17 PROCEDURE — 6360000002 HC RX W HCPCS: Performed by: NURSE PRACTITIONER

## 2023-03-17 PROCEDURE — 83735 ASSAY OF MAGNESIUM: CPT

## 2023-03-17 PROCEDURE — 85027 COMPLETE CBC AUTOMATED: CPT

## 2023-03-17 PROCEDURE — 2060000000 HC ICU INTERMEDIATE R&B

## 2023-03-17 PROCEDURE — 84100 ASSAY OF PHOSPHORUS: CPT

## 2023-03-17 PROCEDURE — 2580000003 HC RX 258: Performed by: INTERNAL MEDICINE

## 2023-03-17 RX ORDER — CALCIUM CARBONATE 200(500)MG
500 TABLET,CHEWABLE ORAL 3 TIMES DAILY PRN
Status: DISCONTINUED | OUTPATIENT
Start: 2023-03-17 | End: 2023-03-20 | Stop reason: HOSPADM

## 2023-03-17 RX ADMIN — CHLORDIAZEPOXIDE HYDROCHLORIDE 25 MG: 10 CAPSULE ORAL at 15:11

## 2023-03-17 RX ADMIN — LORAZEPAM 2 MG: 2 INJECTION INTRAMUSCULAR; INTRAVENOUS at 11:48

## 2023-03-17 RX ADMIN — ANTACID TABLETS 500 MG: 500 TABLET, CHEWABLE ORAL at 23:34

## 2023-03-17 RX ADMIN — CHLORDIAZEPOXIDE HYDROCHLORIDE 25 MG: 10 CAPSULE ORAL at 09:19

## 2023-03-17 RX ADMIN — LORAZEPAM 4 MG: 2 INJECTION INTRAMUSCULAR; INTRAVENOUS at 15:11

## 2023-03-17 RX ADMIN — LORAZEPAM 4 MG: 2 INJECTION INTRAMUSCULAR; INTRAVENOUS at 22:42

## 2023-03-17 RX ADMIN — POTASSIUM CHLORIDE 40 MEQ: 750 TABLET, FILM COATED, EXTENDED RELEASE ORAL at 09:20

## 2023-03-17 RX ADMIN — THIAMINE HCL TAB 100 MG 100 MG: 100 TAB at 09:19

## 2023-03-17 RX ADMIN — THERA TABS 1 TABLET: TAB at 09:19

## 2023-03-17 RX ADMIN — CHLORDIAZEPOXIDE HYDROCHLORIDE 25 MG: 10 CAPSULE ORAL at 22:42

## 2023-03-17 RX ADMIN — Medication 10 ML: at 22:43

## 2023-03-17 RX ADMIN — ONDANSETRON 4 MG: 4 TABLET, ORALLY DISINTEGRATING ORAL at 15:11

## 2023-03-17 RX ADMIN — ONDANSETRON 4 MG: 4 TABLET, ORALLY DISINTEGRATING ORAL at 04:35

## 2023-03-17 RX ADMIN — Medication 10 ML: at 09:21

## 2023-03-17 RX ADMIN — LORAZEPAM 2 MG: 1 TABLET ORAL at 03:23

## 2023-03-17 RX ADMIN — FOLIC ACID: 5 INJECTION, SOLUTION INTRAMUSCULAR; INTRAVENOUS; SUBCUTANEOUS at 11:41

## 2023-03-17 RX ADMIN — LORAZEPAM 3 MG: 1 TABLET ORAL at 09:20

## 2023-03-17 ASSESSMENT — PAIN DESCRIPTION - FREQUENCY
FREQUENCY: CONTINUOUS
FREQUENCY: CONTINUOUS

## 2023-03-17 ASSESSMENT — PAIN - FUNCTIONAL ASSESSMENT
PAIN_FUNCTIONAL_ASSESSMENT: PREVENTS OR INTERFERES SOME ACTIVE ACTIVITIES AND ADLS
PAIN_FUNCTIONAL_ASSESSMENT: PREVENTS OR INTERFERES SOME ACTIVE ACTIVITIES AND ADLS

## 2023-03-17 ASSESSMENT — PAIN DESCRIPTION - ONSET
ONSET: ON-GOING
ONSET: ON-GOING

## 2023-03-17 ASSESSMENT — PAIN DESCRIPTION - LOCATION: LOCATION: ABDOMEN

## 2023-03-17 ASSESSMENT — PAIN DESCRIPTION - DESCRIPTORS
DESCRIPTORS: SHARP
DESCRIPTORS: SHARP

## 2023-03-17 ASSESSMENT — PAIN DESCRIPTION - PAIN TYPE
TYPE: ACUTE PAIN
TYPE: ACUTE PAIN

## 2023-03-17 ASSESSMENT — PAIN SCALES - GENERAL
PAINLEVEL_OUTOF10: 0
PAINLEVEL_OUTOF10: 6
PAINLEVEL_OUTOF10: 0
PAINLEVEL_OUTOF10: 5

## 2023-03-17 ASSESSMENT — PAIN DESCRIPTION - ORIENTATION: ORIENTATION: MID

## 2023-03-18 LAB
ALBUMIN SERPL-MCNC: 2.8 G/DL (ref 3.4–5)
ALP SERPL-CCNC: 196 U/L (ref 40–129)
ALT SERPL-CCNC: 83 U/L (ref 10–40)
ANION GAP SERPL CALCULATED.3IONS-SCNC: 8 MMOL/L (ref 3–16)
AST SERPL-CCNC: 189 U/L (ref 15–37)
BILIRUB DIRECT SERPL-MCNC: >10 MG/DL (ref 0–0.3)
BILIRUB INDIRECT SERPL-MCNC: ABNORMAL MG/DL (ref 0–1)
BILIRUB SERPL-MCNC: 13.4 MG/DL (ref 0–1)
BUN SERPL-MCNC: 4 MG/DL (ref 7–20)
CALCIUM SERPL-MCNC: 8.8 MG/DL (ref 8.3–10.6)
CHLORIDE SERPL-SCNC: 104 MMOL/L (ref 99–110)
CO2 SERPL-SCNC: 25 MMOL/L (ref 21–32)
CREAT SERPL-MCNC: <0.5 MG/DL (ref 0.9–1.3)
DEPRECATED RDW RBC AUTO: 20.3 % (ref 12.4–15.4)
GFR SERPLBLD CREATININE-BSD FMLA CKD-EPI: >60 ML/MIN/{1.73_M2}
GLUCOSE SERPL-MCNC: 103 MG/DL (ref 70–99)
HCT VFR BLD AUTO: 33.1 % (ref 40.5–52.5)
HGB BLD-MCNC: 10.7 G/DL (ref 13.5–17.5)
MAGNESIUM SERPL-MCNC: 2 MG/DL (ref 1.8–2.4)
MCH RBC QN AUTO: 29.3 PG (ref 26–34)
MCHC RBC AUTO-ENTMCNC: 32.4 G/DL (ref 31–36)
MCV RBC AUTO: 90.4 FL (ref 80–100)
PHOSPHATE SERPL-MCNC: 3.1 MG/DL (ref 2.5–4.9)
PLATELET # BLD AUTO: 102 K/UL (ref 135–450)
PMV BLD AUTO: 8.2 FL (ref 5–10.5)
POTASSIUM SERPL-SCNC: 3.4 MMOL/L (ref 3.5–5.1)
PROT SERPL-MCNC: 5.8 G/DL (ref 6.4–8.2)
RBC # BLD AUTO: 3.66 M/UL (ref 4.2–5.9)
SODIUM SERPL-SCNC: 137 MMOL/L (ref 136–145)
WBC # BLD AUTO: 4.4 K/UL (ref 4–11)

## 2023-03-18 PROCEDURE — 80048 BASIC METABOLIC PNL TOTAL CA: CPT

## 2023-03-18 PROCEDURE — 2580000003 HC RX 258: Performed by: INTERNAL MEDICINE

## 2023-03-18 PROCEDURE — 83735 ASSAY OF MAGNESIUM: CPT

## 2023-03-18 PROCEDURE — 80076 HEPATIC FUNCTION PANEL: CPT

## 2023-03-18 PROCEDURE — 36415 COLL VENOUS BLD VENIPUNCTURE: CPT

## 2023-03-18 PROCEDURE — 2060000000 HC ICU INTERMEDIATE R&B

## 2023-03-18 PROCEDURE — 6370000000 HC RX 637 (ALT 250 FOR IP): Performed by: NURSE PRACTITIONER

## 2023-03-18 PROCEDURE — 84100 ASSAY OF PHOSPHORUS: CPT

## 2023-03-18 PROCEDURE — 6370000000 HC RX 637 (ALT 250 FOR IP): Performed by: INTERNAL MEDICINE

## 2023-03-18 PROCEDURE — 6360000002 HC RX W HCPCS: Performed by: INTERNAL MEDICINE

## 2023-03-18 PROCEDURE — 85027 COMPLETE CBC AUTOMATED: CPT

## 2023-03-18 PROCEDURE — 6360000002 HC RX W HCPCS: Performed by: NURSE PRACTITIONER

## 2023-03-18 RX ORDER — POTASSIUM CHLORIDE 750 MG/1
40 TABLET, FILM COATED, EXTENDED RELEASE ORAL ONCE
Status: COMPLETED | OUTPATIENT
Start: 2023-03-18 | End: 2023-03-18

## 2023-03-18 RX ORDER — CLONIDINE HYDROCHLORIDE 0.1 MG/1
0.1 TABLET ORAL 3 TIMES DAILY
Status: DISCONTINUED | OUTPATIENT
Start: 2023-03-18 | End: 2023-03-20 | Stop reason: HOSPADM

## 2023-03-18 RX ADMIN — CLONIDINE HYDROCHLORIDE 0.1 MG: 0.1 TABLET ORAL at 15:09

## 2023-03-18 RX ADMIN — CLONIDINE HYDROCHLORIDE 0.1 MG: 0.1 TABLET ORAL at 21:18

## 2023-03-18 RX ADMIN — CHLORDIAZEPOXIDE HYDROCHLORIDE 25 MG: 10 CAPSULE ORAL at 08:46

## 2023-03-18 RX ADMIN — POTASSIUM CHLORIDE 40 MEQ: 750 TABLET, FILM COATED, EXTENDED RELEASE ORAL at 08:45

## 2023-03-18 RX ADMIN — THERA TABS 1 TABLET: TAB at 08:46

## 2023-03-18 RX ADMIN — Medication 10 ML: at 21:18

## 2023-03-18 RX ADMIN — CHLORDIAZEPOXIDE HYDROCHLORIDE 25 MG: 10 CAPSULE ORAL at 21:18

## 2023-03-18 RX ADMIN — Medication 10 ML: at 08:46

## 2023-03-18 RX ADMIN — LORAZEPAM 2 MG: 2 INJECTION INTRAMUSCULAR; INTRAVENOUS at 00:47

## 2023-03-18 RX ADMIN — THIAMINE HCL TAB 100 MG 100 MG: 100 TAB at 08:45

## 2023-03-18 RX ADMIN — LORAZEPAM 3 MG: 1 TABLET ORAL at 10:47

## 2023-03-18 RX ADMIN — ENOXAPARIN SODIUM 40 MG: 100 INJECTION SUBCUTANEOUS at 08:44

## 2023-03-18 RX ADMIN — CHLORDIAZEPOXIDE HYDROCHLORIDE 25 MG: 10 CAPSULE ORAL at 15:09

## 2023-03-18 RX ADMIN — LORAZEPAM 2 MG: 2 INJECTION INTRAMUSCULAR; INTRAVENOUS at 03:22

## 2023-03-18 RX ADMIN — ACETAMINOPHEN 650 MG: 325 TABLET ORAL at 08:46

## 2023-03-18 RX ADMIN — CHLORDIAZEPOXIDE HYDROCHLORIDE 25 MG: 10 CAPSULE ORAL at 03:21

## 2023-03-18 RX ADMIN — CLONIDINE HYDROCHLORIDE 0.1 MG: 0.1 TABLET ORAL at 08:45

## 2023-03-18 NOTE — FLOWSHEET NOTE
4412 Pt tearful about his wife moving out today. Spoke to him in length about the importance of not drinking again and staying in the hospital until his physicians discharge him. Pt verbalized understanding. This RN also suggested him reach out to his [address] for added support. Fresh fluids provided as requested. Pt denies needs, encouraged call light use.

## 2023-03-18 NOTE — CARE COORDINATION
Discharge Planning:     (CM) Spoke with Patient about substance abuse resources again. Patient advised he was active with [facility] previously and could start going there again. CM asked if he had ever heard of [facility] and if he would be open to someone from the facility to come and talk with him. Patient agreeable to this. CM called Lance with [facility] and provided him the Patient's Information. He advised he would come in to see the Patient today.      Electronically signed by DEEPA Smith on 3/18/2023 at 10:38 AM

## 2023-03-19 LAB
ALBUMIN SERPL-MCNC: 2.8 G/DL (ref 3.4–5)
ALP SERPL-CCNC: 184 U/L (ref 40–129)
ALT SERPL-CCNC: 84 U/L (ref 10–40)
ANION GAP SERPL CALCULATED.3IONS-SCNC: 9 MMOL/L (ref 3–16)
ANISOCYTOSIS BLD QL SMEAR: ABNORMAL
AST SERPL-CCNC: 158 U/L (ref 15–37)
BASOPHILS # BLD: 0.1 K/UL (ref 0–0.2)
BASOPHILS NFR BLD: 1 %
BILIRUB DIRECT SERPL-MCNC: 9.3 MG/DL (ref 0–0.3)
BILIRUB INDIRECT SERPL-MCNC: 3.3 MG/DL (ref 0–1)
BILIRUB SERPL-MCNC: 12.6 MG/DL (ref 0–1)
BUN SERPL-MCNC: 6 MG/DL (ref 7–20)
CALCIUM SERPL-MCNC: 9 MG/DL (ref 8.3–10.6)
CHLORIDE SERPL-SCNC: 104 MMOL/L (ref 99–110)
CO2 SERPL-SCNC: 25 MMOL/L (ref 21–32)
CREAT SERPL-MCNC: <0.5 MG/DL (ref 0.9–1.3)
DEPRECATED RDW RBC AUTO: 20.7 % (ref 12.4–15.4)
EOSINOPHIL # BLD: 0.1 K/UL (ref 0–0.6)
EOSINOPHIL NFR BLD: 1 %
GFR SERPLBLD CREATININE-BSD FMLA CKD-EPI: >60 ML/MIN/{1.73_M2}
GLUCOSE SERPL-MCNC: 90 MG/DL (ref 70–99)
HCT VFR BLD AUTO: 34.2 % (ref 40.5–52.5)
HGB BLD-MCNC: 11.3 G/DL (ref 13.5–17.5)
LYMPHOCYTES # BLD: 1.1 K/UL (ref 1–5.1)
LYMPHOCYTES NFR BLD: 17 %
MCH RBC QN AUTO: 30.4 PG (ref 26–34)
MCHC RBC AUTO-ENTMCNC: 33 G/DL (ref 31–36)
MCV RBC AUTO: 92.1 FL (ref 80–100)
MONOCYTES # BLD: 0.2 K/UL (ref 0–1.3)
MONOCYTES NFR BLD: 3 %
NEUTROPHILS # BLD: 4.7 K/UL (ref 1.7–7.7)
NEUTROPHILS NFR BLD: 76 %
NEUTS BAND NFR BLD MANUAL: 1 % (ref 0–7)
PHOSPHATE SERPL-MCNC: 3.2 MG/DL (ref 2.5–4.9)
PLATELET # BLD AUTO: 109 K/UL (ref 135–450)
PLATELET BLD QL SMEAR: ABNORMAL
PMV BLD AUTO: 8.2 FL (ref 5–10.5)
POLYCHROMASIA BLD QL SMEAR: ABNORMAL
POTASSIUM SERPL-SCNC: 3.7 MMOL/L (ref 3.5–5.1)
PROT SERPL-MCNC: 6.3 G/DL (ref 6.4–8.2)
RBC # BLD AUTO: 3.71 M/UL (ref 4.2–5.9)
SLIDE REVIEW: ABNORMAL
SODIUM SERPL-SCNC: 138 MMOL/L (ref 136–145)
TARGETS BLD QL SMEAR: ABNORMAL
VARIANT LYMPHS NFR BLD MANUAL: 1 % (ref 0–6)
WBC # BLD AUTO: 6.1 K/UL (ref 4–11)

## 2023-03-19 PROCEDURE — 80076 HEPATIC FUNCTION PANEL: CPT

## 2023-03-19 PROCEDURE — 6360000002 HC RX W HCPCS: Performed by: INTERNAL MEDICINE

## 2023-03-19 PROCEDURE — 2580000003 HC RX 258: Performed by: NURSE PRACTITIONER

## 2023-03-19 PROCEDURE — 85025 COMPLETE CBC W/AUTO DIFF WBC: CPT

## 2023-03-19 PROCEDURE — 6370000000 HC RX 637 (ALT 250 FOR IP): Performed by: INTERNAL MEDICINE

## 2023-03-19 PROCEDURE — 80069 RENAL FUNCTION PANEL: CPT

## 2023-03-19 PROCEDURE — 36415 COLL VENOUS BLD VENIPUNCTURE: CPT

## 2023-03-19 PROCEDURE — 2060000000 HC ICU INTERMEDIATE R&B

## 2023-03-19 PROCEDURE — 2580000003 HC RX 258: Performed by: INTERNAL MEDICINE

## 2023-03-19 PROCEDURE — 6370000000 HC RX 637 (ALT 250 FOR IP): Performed by: NURSE PRACTITIONER

## 2023-03-19 RX ORDER — SODIUM CHLORIDE 9 MG/ML
INJECTION, SOLUTION INTRAVENOUS CONTINUOUS
Status: DISCONTINUED | OUTPATIENT
Start: 2023-03-19 | End: 2023-03-20 | Stop reason: HOSPADM

## 2023-03-19 RX ORDER — CLONAZEPAM 1 MG/1
1 TABLET ORAL ONCE
Status: COMPLETED | OUTPATIENT
Start: 2023-03-19 | End: 2023-03-19

## 2023-03-19 RX ADMIN — ENOXAPARIN SODIUM 40 MG: 100 INJECTION SUBCUTANEOUS at 09:22

## 2023-03-19 RX ADMIN — CLONIDINE HYDROCHLORIDE 0.1 MG: 0.1 TABLET ORAL at 09:21

## 2023-03-19 RX ADMIN — CHLORDIAZEPOXIDE HYDROCHLORIDE 25 MG: 10 CAPSULE ORAL at 04:41

## 2023-03-19 RX ADMIN — SODIUM CHLORIDE: 9 INJECTION, SOLUTION INTRAVENOUS at 11:07

## 2023-03-19 RX ADMIN — CHLORDIAZEPOXIDE HYDROCHLORIDE 25 MG: 10 CAPSULE ORAL at 09:20

## 2023-03-19 RX ADMIN — Medication 10 ML: at 23:11

## 2023-03-19 RX ADMIN — CLONIDINE HYDROCHLORIDE 0.1 MG: 0.1 TABLET ORAL at 16:02

## 2023-03-19 RX ADMIN — SODIUM CHLORIDE: 9 INJECTION, SOLUTION INTRAVENOUS at 23:11

## 2023-03-19 RX ADMIN — THIAMINE HCL TAB 100 MG 100 MG: 100 TAB at 09:21

## 2023-03-19 RX ADMIN — CHLORDIAZEPOXIDE HYDROCHLORIDE 25 MG: 10 CAPSULE ORAL at 16:02

## 2023-03-19 RX ADMIN — Medication 10 ML: at 09:22

## 2023-03-19 RX ADMIN — CLONAZEPAM 1 MG: 1 TABLET ORAL at 12:07

## 2023-03-19 RX ADMIN — CHLORDIAZEPOXIDE HYDROCHLORIDE 25 MG: 10 CAPSULE ORAL at 22:44

## 2023-03-19 RX ADMIN — CLONIDINE HYDROCHLORIDE 0.1 MG: 0.1 TABLET ORAL at 22:44

## 2023-03-19 RX ADMIN — THERA TABS 1 TABLET: TAB at 09:22

## 2023-03-19 ASSESSMENT — PAIN SCALES - GENERAL
PAINLEVEL_OUTOF10: 0
PAINLEVEL_OUTOF10: 0

## 2023-03-19 NOTE — PLAN OF CARE
Problem: Discharge Planning  Goal: Discharge to home or other facility with appropriate resources  3/18/2023 0836 by Jane Tolentino RN  Outcome: Progressing  3/18/2023 0221 by Amor Willoughby RN  Outcome: Progressing  Flowsheets (Taken 3/17/2023 2159)  Discharge to home or other facility with appropriate resources: Identify barriers to discharge with patient and caregiver     Problem: Pain  Goal: Verbalizes/displays adequate comfort level or baseline comfort level  3/18/2023 0836 by Jane Tolentino RN  Outcome: Progressing  3/18/2023 0221 by Amor Willoughby RN  Outcome: Progressing  Flowsheets (Taken 3/17/2023 2145)  Verbalizes/displays adequate comfort level or baseline comfort level:   Encourage patient to monitor pain and request assistance   Assess pain using appropriate pain scale   Administer analgesics based on type and severity of pain and evaluate response   Implement non-pharmacological measures as appropriate and evaluate response     Problem: Risk for Elopement  Goal: Patient will not exit the unit/facility without proper excort  3/18/2023 0836 by Jane Tolentino RN  Outcome: Progressing  [address] (Taken 3/18/2023 0516 by Amor Willoughby RN)  Nursing Interventions for Elopement Risk:   Assist with personal care needs such as toileting, eating, dressing, as needed to reduce the risk of wandering   Collaborate with treatment team for drug withdrawal symptoms treatment   Make sure patient has all necessary personal care items   Reduce environmental triggers   Shoes and clothing collected and placed in gown attire  3/18/2023 0221 by Amor Willoughby RN  Outcome: Progressing  Flowsheets  Taken 3/18/2023 0045  Nursing Interventions for Elopement Risk:   Assist with personal care needs such as toileting, eating, dressing, as needed to reduce the risk of wandering   Collaborate with treatment team for drug withdrawal symptoms treatment   Make sure patient has all necessary personal
Problem: Discharge Planning  Goal: Discharge to home or other facility with appropriate resources  Outcome: Not Progressing  Flowsheets (Taken 3/18/2023 2122 by Serge Billingsley RN)  Discharge to home or other facility with appropriate resources: Identify barriers to discharge with patient and caregiver
Problem: Risk for Elopement  Goal: Patient will not exit the unit/facility without proper excort  Flowsheets  Taken 3/19/2023 1915 by Lizbeth Alejo RN  Nursing Interventions for Elopement Risk: Collaborate with treatment team for drug withdrawal symptoms treatment  Taken 3/19/2023 1413 by Lizbeth Alejo RN  Nursing Interventions for Elopement Risk:   Assist with personal care needs such as toileting, eating, dressing, as needed to reduce the risk of wandering   Collaborate with treatment team for drug withdrawal symptoms treatment   Make sure patient has all necessary personal care items   Reduce environmental triggers   Shoes and clothing collected and placed in gown attire
Metabolic/Fluid and Electrolytes - Adult  Goal: Electrolytes maintained within normal limits  3/17/2023 1441 by Steffany Stinson RN  Outcome: Progressing    Problem: Metabolic/Fluid and Electrolytes - Adult  Goal: Hemodynamic stability and optimal renal function maintained  3/17/2023 1441 by Steffany Stinson RN  Outcome: Progressing     Problem: Skin/Tissue Integrity - Adult  Goal: Skin integrity remains intact  3/17/2023 1441 by Steffany Stinson RN  Outcome: Progressing    Problem: Skin/Tissue Integrity - Adult  Goal: Oral mucous membranes remain intact  3/17/2023 1441 by Steffany Stinson RN  Outcome: Progressing     Problem: Anxiety  Goal: Will report anxiety at manageable levels  Description: INTERVENTIONS:  1. Administer medication as ordered  2. Teach and rehearse alternative coping skills  3. Provide emotional support with 1:1 interaction with staff  Outcome: Progressing     Problem: Drug Abuse/Detox  Goal: Will have no detox symptoms and will verbalize plan for changing drug-related behavior  Description: INTERVENTIONS:  1. Administer medication as ordered  2. Monitor physical status  3. Provide emotional support with 1:1 interaction with staff  4.  Encourage  recovery focused treatment   Outcome: Progressing
(Taken 3/15/2023 2329)  Minimal or absence of nausea and vomiting:   Administer ordered antiemetic medications as needed   Provide nonpharmacologic comfort measures as appropriate   Advance diet as tolerated, if ordered  Goal: Maintains or returns to baseline bowel function  Outcome: Progressing  Flowsheets (Taken 3/15/2023 2329)  Maintains or returns to baseline bowel function:   Assess bowel function   Encourage oral fluids to ensure adequate hydration   Administer ordered medications as needed  Goal: Maintains adequate nutritional intake  Outcome: Progressing  Flowsheets (Taken 3/15/2023 2329)  Maintains adequate nutritional intake: Monitor intake and output, weight and lab values     Problem: Metabolic/Fluid and Electrolytes - Adult  Goal: Electrolytes maintained within normal limits  Outcome: Progressing  Flowsheets (Taken 3/15/2023 2329)  Electrolytes maintained within normal limits:   Monitor labs and assess patient for signs and symptoms of electrolyte imbalances   Administer electrolyte replacement as ordered   Monitor response to electrolyte replacements, including repeat lab results as appropriate  Goal: Hemodynamic stability and optimal renal function maintained  Outcome: Progressing  Flowsheets (Taken 3/15/2023 2329)  Hemodynamic stability and optimal renal function maintained:   Monitor labs and assess for signs and symptoms of volume excess or deficit   Monitor intake, output and patient weight   Monitor urine specific gravity, serum osmolarity and serum sodium as indicated or ordered   Monitor response to interventions for patient's volume status, including labs, urine output, blood pressure (other measures as available)   Encourage oral intake as appropriate
replacement as ordered  3/18/2023 0836 by Rossy Hugo RN  Outcome: Progressing     Problem: Gastrointestinal - Adult  Goal: Minimal or absence of nausea and vomiting  3/18/2023 2126 by Sandhya Block RN  Outcome: Progressing  Flowsheets (Taken 3/18/2023 2122)  Minimal or absence of nausea and vomiting:   Administer ordered antiemetic medications as needed   Provide nonpharmacologic comfort measures as appropriate  3/18/2023 0836 by Rossy Hguo RN  Outcome: Progressing  Goal: Maintains or returns to baseline bowel function  3/18/2023 2126 by Sandhya Block RN  Outcome: Progressing  Flowsheets (Taken 3/18/2023 2122)  Maintains or returns to baseline bowel function:   Assess bowel function   Encourage oral fluids to ensure adequate hydration   Administer ordered medications as needed  3/18/2023 0836 by Rossy Hugo RN  Outcome: Progressing  Goal: Maintains adequate nutritional intake  3/18/2023 2126 by Sandhya Block RN  Outcome: Progressing  Flowsheets (Taken 3/18/2023 2122)  Maintains adequate nutritional intake: Monitor intake and output, weight and lab values  3/18/2023 0836 by Rossy Hugo RN  Outcome: Progressing     Problem: Metabolic/Fluid and Electrolytes - Adult  Goal: Electrolytes maintained within normal limits  3/18/2023 2126 by Sandhya Block RN  Outcome: Progressing  Flowsheets (Taken 3/18/2023 2122)  Electrolytes maintained within normal limits:   Monitor labs and assess patient for signs and symptoms of electrolyte imbalances   Administer electrolyte replacement as ordered   Monitor response to electrolyte replacements, including repeat lab results as appropriate  3/18/2023 0836 by Rossy Hugo RN  Outcome: Progressing  Goal: Hemodynamic stability and optimal renal function maintained  3/18/2023 2126 by Sandhya Block RN  Outcome: Progressing  Flowsheets (Taken 3/18/2023 2122)  Hemodynamic stability and optimal renal function maintained:
including labs, urine output, blood pressure (other measures as available)   Encourage oral intake as appropriate  3/17/2023 1441 by Kg Wilder RN  Outcome: Progressing     Problem: Skin/Tissue Integrity - Adult  Goal: Skin integrity remains intact  3/18/2023 0221 by Mignon Edmonds RN  Outcome: Progressing  Flowsheets (Taken 3/17/2023 2159)  Skin Integrity Remains Intact:   Monitor for areas of redness and/or skin breakdown   Assess vascular access sites hourly  3/17/2023 1441 by Kg Wilder RN  Outcome: Progressing  Flowsheets  Taken 3/17/2023 0747 by Silver Snide  Skin Integrity Remains Intact: Monitor for areas of redness and/or skin breakdown  Taken 3/17/2023 0318 by Glenn Kirkpatrick RN  Skin Integrity Remains Intact: Monitor for areas of redness and/or skin breakdown  Goal: Oral mucous membranes remain intact  3/18/2023 0221 by Mignon Edmonds RN  Outcome: Progressing  Flowsheets (Taken 3/17/2023 2159)  Oral Mucous Membranes Remain Intact:   Assess oral mucosa and hygiene practices   Implement preventative oral hygiene regimen  3/17/2023 1441 by Kg Wilder RN  Outcome: Progressing     Problem: Anxiety  Goal: Will report anxiety at manageable levels  Description: INTERVENTIONS:  1. Administer medication as ordered  2. Teach and rehearse alternative coping skills  3. Provide emotional support with 1:1 interaction with staff  3/18/2023 0221 by Mignon Edmonds RN  Outcome: Progressing  Flowsheets (Taken 3/17/2023 2159)  Will report anxiety at manageable levels:   Administer medication as ordered   Teach and rehearse alternative coping skills   Provide emotional support with 1:1 interaction with staff  3/17/2023 1441 by Kg Wilder RN  Outcome: Progressing     Problem: Drug Abuse/Detox  Goal: Will have no detox symptoms and will verbalize plan for changing drug-related behavior  Description: INTERVENTIONS:  1. Administer medication as ordered  2. Monitor physical status  3.  Provide
or ordered   Monitor response to interventions for patient's volume status, including labs, urine output, blood pressure (other measures as available)   Encourage oral intake as appropriate  3/16/2023 1200 by Bridger Jackson RN  Outcome: Progressing     Problem: Skin/Tissue Integrity - Adult  Goal: Skin integrity remains intact  3/17/2023 0054 by Johana Poe RN  Outcome: Progressing  Flowsheets (Taken 3/16/2023 2030)  Skin Integrity Remains Intact:   Monitor for areas of redness and/or skin breakdown   Assess vascular access sites hourly  3/16/2023 1200 by Bridger Jackson RN  Outcome: Progressing  Flowsheets (Taken 3/15/2023 2329 by Johana Poe RN)  Skin Integrity Remains Intact:   Monitor for areas of redness and/or skin breakdown   Assess vascular access sites hourly  Goal: Oral mucous membranes remain intact  3/17/2023 0054 by Johana Poe RN  Outcome: Progressing  Flowsheets (Taken 3/16/2023 2030)  Oral Mucous Membranes Remain Intact:   Assess oral mucosa and hygiene practices   Implement preventative oral hygiene regimen  3/16/2023 1200 by Bridger Jackson RN  Outcome: Progressing  Flowsheets (Taken 3/15/2023 2329 by Johana Poe RN)  Oral Mucous Membranes Remain Intact:   Assess oral mucosa and hygiene practices   Implement preventative oral hygiene regimen

## 2023-03-20 VITALS
WEIGHT: 297.7 LBS | DIASTOLIC BLOOD PRESSURE: 76 MMHG | RESPIRATION RATE: 14 BRPM | HEIGHT: 70 IN | BODY MASS INDEX: 42.62 KG/M2 | TEMPERATURE: 97.9 F | HEART RATE: 89 BPM | SYSTOLIC BLOOD PRESSURE: 134 MMHG | OXYGEN SATURATION: 96 %

## 2023-03-20 LAB
ALBUMIN SERPL-MCNC: 2.8 G/DL (ref 3.4–5)
ALP SERPL-CCNC: 179 U/L (ref 40–129)
ALT SERPL-CCNC: 80 U/L (ref 10–40)
ANION GAP SERPL CALCULATED.3IONS-SCNC: 8 MMOL/L (ref 3–16)
ANISOCYTOSIS BLD QL SMEAR: ABNORMAL
AST SERPL-CCNC: 136 U/L (ref 15–37)
BASOPHILS # BLD: 0 K/UL (ref 0–0.2)
BASOPHILS NFR BLD: 0 %
BILIRUB DIRECT SERPL-MCNC: 8.2 MG/DL (ref 0–0.3)
BILIRUB INDIRECT SERPL-MCNC: 2.3 MG/DL (ref 0–1)
BILIRUB SERPL-MCNC: 10.5 MG/DL (ref 0–1)
BUN SERPL-MCNC: 7 MG/DL (ref 7–20)
CALCIUM SERPL-MCNC: 9 MG/DL (ref 8.3–10.6)
CHLORIDE SERPL-SCNC: 104 MMOL/L (ref 99–110)
CO2 SERPL-SCNC: 24 MMOL/L (ref 21–32)
CREAT SERPL-MCNC: <0.5 MG/DL (ref 0.9–1.3)
DEPRECATED RDW RBC AUTO: 20.5 % (ref 12.4–15.4)
EOSINOPHIL # BLD: 0.1 K/UL (ref 0–0.6)
EOSINOPHIL NFR BLD: 1 %
GFR SERPLBLD CREATININE-BSD FMLA CKD-EPI: >60 ML/MIN/{1.73_M2}
GLUCOSE SERPL-MCNC: 99 MG/DL (ref 70–99)
HCT VFR BLD AUTO: 34.1 % (ref 40.5–52.5)
HGB BLD-MCNC: 11.3 G/DL (ref 13.5–17.5)
INR PPP: 0.98 (ref 0.87–1.14)
LYMPHOCYTES # BLD: 0.8 K/UL (ref 1–5.1)
LYMPHOCYTES NFR BLD: 12 %
MACROCYTES BLD QL SMEAR: ABNORMAL
MCH RBC QN AUTO: 30.6 PG (ref 26–34)
MCHC RBC AUTO-ENTMCNC: 33.2 G/DL (ref 31–36)
MCV RBC AUTO: 92.3 FL (ref 80–100)
METAMYELOCYTES NFR BLD MANUAL: 1 %
MONOCYTES # BLD: 0.8 K/UL (ref 0–1.3)
MONOCYTES NFR BLD: 12 %
NEUTROPHILS # BLD: 4.7 K/UL (ref 1.7–7.7)
NEUTROPHILS NFR BLD: 72 %
NEUTS BAND NFR BLD MANUAL: 2 % (ref 0–7)
PHOSPHATE SERPL-MCNC: 2.9 MG/DL (ref 2.5–4.9)
PLATELET # BLD AUTO: 107 K/UL (ref 135–450)
PLATELET BLD QL SMEAR: ABNORMAL
PMV BLD AUTO: 8.5 FL (ref 5–10.5)
POTASSIUM SERPL-SCNC: 3.8 MMOL/L (ref 3.5–5.1)
PROT SERPL-MCNC: 6.3 G/DL (ref 6.4–8.2)
PROTHROMBIN TIME: 12.9 SEC (ref 11.7–14.5)
RBC # BLD AUTO: 3.7 M/UL (ref 4.2–5.9)
SLIDE REVIEW: ABNORMAL
SODIUM SERPL-SCNC: 136 MMOL/L (ref 136–145)
WBC # BLD AUTO: 6.3 K/UL (ref 4–11)

## 2023-03-20 PROCEDURE — 85025 COMPLETE CBC W/AUTO DIFF WBC: CPT

## 2023-03-20 PROCEDURE — 80076 HEPATIC FUNCTION PANEL: CPT

## 2023-03-20 PROCEDURE — 6370000000 HC RX 637 (ALT 250 FOR IP): Performed by: NURSE PRACTITIONER

## 2023-03-20 PROCEDURE — 6370000000 HC RX 637 (ALT 250 FOR IP): Performed by: INTERNAL MEDICINE

## 2023-03-20 PROCEDURE — 36415 COLL VENOUS BLD VENIPUNCTURE: CPT

## 2023-03-20 PROCEDURE — 6360000002 HC RX W HCPCS: Performed by: INTERNAL MEDICINE

## 2023-03-20 PROCEDURE — 85610 PROTHROMBIN TIME: CPT

## 2023-03-20 PROCEDURE — 80069 RENAL FUNCTION PANEL: CPT

## 2023-03-20 PROCEDURE — 2580000003 HC RX 258: Performed by: INTERNAL MEDICINE

## 2023-03-20 RX ORDER — CLONIDINE HYDROCHLORIDE 0.1 MG/1
0.1 TABLET ORAL 3 TIMES DAILY
Qty: 90 TABLET | Refills: 0 | Status: SHIPPED | OUTPATIENT
Start: 2023-03-20 | End: 2023-03-20 | Stop reason: SDUPTHER

## 2023-03-20 RX ORDER — CLONIDINE HYDROCHLORIDE 0.1 MG/1
0.1 TABLET ORAL 3 TIMES DAILY
Qty: 45 TABLET | Refills: 0 | Status: SHIPPED | OUTPATIENT
Start: 2023-03-20 | End: 2023-04-04

## 2023-03-20 RX ADMIN — THIAMINE HCL TAB 100 MG 100 MG: 100 TAB at 09:18

## 2023-03-20 RX ADMIN — CHLORDIAZEPOXIDE HYDROCHLORIDE 25 MG: 10 CAPSULE ORAL at 09:18

## 2023-03-20 RX ADMIN — THERA TABS 1 TABLET: TAB at 09:20

## 2023-03-20 RX ADMIN — Medication 10 ML: at 09:19

## 2023-03-20 RX ADMIN — CLONIDINE HYDROCHLORIDE 0.1 MG: 0.1 TABLET ORAL at 09:18

## 2023-03-20 RX ADMIN — CHLORDIAZEPOXIDE HYDROCHLORIDE 25 MG: 10 CAPSULE ORAL at 03:56

## 2023-03-20 RX ADMIN — ENOXAPARIN SODIUM 40 MG: 100 INJECTION SUBCUTANEOUS at 09:19

## 2023-03-20 ASSESSMENT — PAIN SCALES - GENERAL: PAINLEVEL_OUTOF10: 0

## 2023-03-20 NOTE — PROGRESS NOTES
CLINICAL PHARMACY NOTE: MEDS TO BEDS    Total # of Prescriptions Filled: 1   The following medications were delivered to the patient:  Clonidine 0.1 mg    Additional Documentation:  Delivered to patient=signed  Ok to be delivered per Zak Lacey CPhT
Data- discharge order received, pt verbalized agreement to discharge, disposition to previous residence, no needs for HHC/DME. Action- discharge instructions prepared and given to patient, pt verbalized understanding. Medication information packet given r/t NEW and/or CHANGED prescriptions emphasizing name/purpose/side effects, pt verbalized understanding. Discharge instruction summary: Diet- reg, Activity- as tolerated, Primary Care Physician as follows: No primary care provider on file. None f/u appointment as scheduled, immunizations reviewed, prescription medications filled at outpatient pharmacy. 1. WEIGHT: Admit Weight: 295 lb (133.8 kg) (03/15/23 2319)        Today  Weight: 297 lb 11.2 oz (135 kg) (03/18/23 0817)       2. O2 SAT.: SpO2: 96 % (03/20/23 0715)    Response- Pt belongings gathered, IV removed. Disposition is home (no HHC/DME needs), transported with personal belongings, taken to lobby via w/c w/ RN, no complications.
Gastroenterology Progress Note      Aruna Villar is a 40 y.o. male patient. 1. Alcohol withdrawal syndrome with complication ([address]    2. Transaminitis    3. Hyperbilirubinemia    4. Hypomagnesemia    5. Hypokalemia    6. Contusion of chest wall, unspecified laterality, initial encounter        SUBJECTIVE:   No nausea or vomiting. Tolerated clear liquids. Asking for solid food. The right upper quadrant pain is stable -constant, worse with certain movements. Physical    VITALS:  BP (!) 149/90   Pulse 87   Temp 98.6 °F (37 °C) (Oral)   Resp 16   Ht 5' 10\" (1.778 m)   Wt 298 lb 9.6 oz (135.4 kg)   SpO2 97%   BMI 42.84 kg/m²   TEMPERATURE:  Current - Temp: 98.6 °F (37 °C); Max - Temp  Av.2 °F (36.8 °C)  Min: 97.8 °F (36.6 °C)  Max: 98.6 °F (37 °C)    NAD  RRR  Abdomen soft, ND, NT, no HSM, Bowel sounds normal  Jaundice alert and oriented, no tremors    Data    Data Review:    Recent Labs     03/15/23  1931 03/16/23  0440 03/17/23  0430   WBC 5.5 5.7 4.4   HGB 12.4* 11.3* 11.4*   HCT 38.1* 33.9* 33.7*   MCV 90.3 89.6 89.4   PLT 97* 86* 109*     Recent Labs     03/15/23  1931 03/16/23  0440 03/17/23  0429   * 133* 135*   K 3.0* 3.3* 3.7   CL 98* 99 101   CO2 23 24 24   PHOS  --  2.4* 2.2*   BUN 9 9 6*   CREATININE <0.5* <0.5* <0.5*     Recent Labs     03/15/23  1931 03/16/23  0440 03/17/23  0429   * 230* 263*   * 88* 93*   BILIDIR 8.3* 9.2* >10.0*   BILITOT 12.1* 11.8* 13.2*   ALKPHOS 209* 201* 204*     Recent Labs     03/15/23  1931   LIPASE 34.0     Recent Labs     03/15/23  1931 23  0430   PROTIME 14.0 13.7   INR 1.09 1.06     No results for input(s): PTT in the last 72 hours. ASSESSMENT / PLAN      Alcohol hepatitis -recurrent. Bilirubin significantly higher than a few weeks ago. INR is normal.  Discriminant function is 11.8 so does not meet criteria for steroids. CT with hepatomegaly with marked steatosis and no biliary dilation.   Last ultrasound
mg, Q1H PRN   Or  LORazepam (ATIVAN) tablet 3 mg, Q1H PRN   Or  LORazepam (ATIVAN) injection 3 mg, Q1H PRN   Or  LORazepam (ATIVAN) tablet 4 mg, Q1H PRN   Or  LORazepam (ATIVAN) injection 4 mg, Q1H PRN      Objective:  BP (!) 161/75   Pulse 87   Temp 98 °F (36.7 °C) (Oral)   Resp 18   Ht 5' 10\" (1.778 m)   Wt 298 lb 9.6 oz (135.4 kg)   SpO2 97%   BMI 42.84 kg/m²     Intake/Output Summary (Last 24 hours) at 3/18/2023 0717  Last data filed at 3/18/2023 0045  Gross per 24 hour   Intake 1009.05 ml   Output 600 ml   Net 409.05 ml        Wt Readings from Last 3 Encounters:   03/17/23 298 lb 9.6 oz (135.4 kg)   02/23/23 (!) 309 lb 1.4 oz (140.2 kg)   04/15/22 (!) 306 lb 12.8 oz (139.2 kg)       General appearance:  Appears anxious, alert and pleasant , obese  Eyes: Sclera clear. Pupils equal.  ENT: Moist oral mucosa. Trachea midline, no adenopathy. Cardiovascular: Regular rhythm, normal S1, S2. No murmur. No edema in lower extremities  Respiratory: Not using accessory muscles. Good inspiratory effort. Clear to auscultation bilaterally, no wheeze or crackles. GI: Abdomen soft, no tenderness, not distended, normal bowel sounds  Musculoskeletal: No cyanosis in digits, neck supple  Neurology: CN 2-12 grossly intact. No speech or motor deficits  Psych: Normal affect.  Alert and oriented in time, place and person  Skin: Warm, dry, normal turgor    Labs and Tests:  CBC:   Recent Labs     03/16/23 0440 03/17/23 0430 03/18/23 0439   WBC 5.7 4.4 4.4   HGB 11.3* 11.4* 10.7*   PLT 86* 109* 102*       BMP:    Recent Labs     03/16/23 0440 03/17/23 0429 03/18/23 0439   * 135* 137   K 3.3* 3.7 3.4*   CL 99 101 104   CO2 24 24 25   BUN 9 6* 4*   CREATININE <0.5* <0.5* <0.5*   GLUCOSE 114* 101* 103*       Hepatic:   Recent Labs     03/16/23  0440 03/17/23  0429 03/18/23  0439   * 263* 189*   ALT 88* 93* 83*   BILITOT 11.8* 13.2* 13.4*   ALKPHOS 201* 204* 196*       CT head and CT C spine    No acute CT
acute traumatic injury identified. 2.  Hepatomegaly with marked hepatic steatosis. Problem List  Principal Problem:    Alcohol abuse with withdrawal, unspecified ([address]  Active Problems:    History of hepatitis C    Depression    Hyperbilirubinemia  Resolved Problems:    * No resolved hospital problems. *       Assessment & Plan:   Alcohol abuse with withdrawal:  having more tremors and anxiety today. I added scheduled librium in addition to the ativan per STEVE. We discussed the need for complete cessation. He is aware of community resources   Transaminitis /  hyperbilirubinemia:   GI is following , INR remains normal , no indication for steroids. Will continue to follow the labs.  to also follow       Diet: ADULT DIET;  Clear Liquid  Code:Full Code  DVT PPX      LATHA Quiros CNP   3/17/2023 7:36 AM
spine    No acute CT abnormality identified in the brain. No acute osseous abnormality identified in the cervical spine     CT chest and abd    No acute traumatic injury identified. 2.  Hepatomegaly with marked hepatic steatosis. Problem List  Principal Problem:    Alcohol abuse with withdrawal, unspecified ([address]  Active Problems:    History of hepatitis C    Depression    Hyperbilirubinemia  Resolved Problems:    * No resolved hospital problems. *       Assessment & Plan:   Alcohol abuse with withdrawal: no current tremors on the scheduled librium  and clonidine. continue with CIWA. We discussed the need for complete cessation. He is aware of community resources   Transaminitis /  hyperbilirubinemia/ alcoholic liver disease:   Bili starting to trend downward today. Will recheck labs in am with INR. If they remain down trending will consider d/c home with outpatient follow up of his LFT's. Elevated blood pressure: Better with clonidine    If he wants to leave today he needs to sign out AMA . May shower. If LFT's/ bili down trending on Monday then I will d/c him       Diet: ADULT DIET;  Regular  Code:Full Code  DVT PPX      LATHA Wilson CNP   3/19/2023 7:06 AM

## 2023-03-20 NOTE — DISCHARGE SUMMARY
these medications      multivitamin Tabs tablet  Take 1 tablet by mouth daily     vitamin B-1 100 MG tablet  Commonly known as: THIAMINE  Take 1 tablet by mouth daily  Notes to patient: Supplement  Use: Treatment of thiaminie deficiency, dietary supplement  Side Effects: Nausea            STOP taking these medications      lidocaine 5 %  Commonly known as: Lidoderm            ASK your doctor about these medications      buPROPion 75 MG tablet  Commonly known as: Janneth Kelly  Notes to patient: Use: to treat depression  Side effects: anxiety, dry mouth, irritability, restlessness, irregular heartbeat     gabapentin 400 MG capsule  Commonly known as: NEURONTIN  Notes to patient: Gabapentin (Neurontin)  Use: to treat nerve pain, seizures  Side effects: nausea, unsteady gait               Where to Get Your Medications        These medications were sent to [facility], [address] [place] [address], [address] [place] [address] Road      Phone: [phone]   cloNIDine 0.1 MG tablet         Discharge recommendations given to patient. Follow Up. in 1 week   Disposition. home  Activity. activity as tolerated  Diet: ADULT DIET; Regular      Spent 35 minutes in discharge process.     Signed:  LATHA Downs CNP     3/20/2023 7:44 AM

## 2023-03-21 LAB
HFE GENE MUT ANL BLD/T: NORMAL
HFE P.C282Y BLD/T QL: NEGATIVE
HFE P.H63D BLD/T QL: NEGATIVE
HFE P.S65C BLD/T QL: NEGATIVE
SPECIMEN SOURCE: NORMAL

## 2023-05-15 ENCOUNTER — HOSPITAL ENCOUNTER (INPATIENT)
Age: 38
LOS: 1 days | Discharge: HOME | End: 2023-05-17
Attending: INTERNAL MEDICINE | Admitting: INTERNAL MEDICINE
Payer: COMMERCIAL

## 2023-05-15 DIAGNOSIS — R74.01 TRANSAMINITIS: ICD-10-CM

## 2023-05-15 DIAGNOSIS — R79.89 INCREASED AMMONIA LEVEL: ICD-10-CM

## 2023-05-15 DIAGNOSIS — F10.939 ALCOHOL WITHDRAWAL SYNDROME WITH COMPLICATION, WITH UNSPECIFIED COMPLICATION (HCC): ICD-10-CM

## 2023-05-15 DIAGNOSIS — F10.939 ALCOHOL WITHDRAWAL SYNDROME WITH COMPLICATION (HCC): Primary | ICD-10-CM

## 2023-05-15 LAB
ALBUMIN SERPL-MCNC: 4.3 G/DL (ref 3.4–5)
ALP SERPL-CCNC: 172 U/L (ref 40–129)
ALT SERPL-CCNC: 124 U/L (ref 10–40)
AMMONIA PLAS-SCNC: 62 UMOL/L (ref 16–60)
AMPHETAMINES UR QL SCN>1000 NG/ML: ABNORMAL
AMYLASE SERPL-CCNC: 33 U/L (ref 25–115)
ANION GAP SERPL CALCULATED.3IONS-SCNC: 16 MMOL/L (ref 3–16)
APAP SERPL-MCNC: <5 UG/ML (ref 10–30)
AST SERPL-CCNC: 378 U/L (ref 15–37)
BACTERIA URNS QL MICRO: ABNORMAL /HPF
BARBITURATES UR QL SCN>200 NG/ML: ABNORMAL
BASOPHILS # BLD: 0.1 K/UL (ref 0–0.2)
BASOPHILS NFR BLD: 0.8 %
BENZODIAZ UR QL SCN>200 NG/ML: POSITIVE
BILIRUB DIRECT SERPL-MCNC: 5.2 MG/DL (ref 0–0.3)
BILIRUB INDIRECT SERPL-MCNC: 2.5 MG/DL (ref 0–1)
BILIRUB SERPL-MCNC: 7.7 MG/DL (ref 0–1)
BILIRUB UR QL STRIP.AUTO: ABNORMAL
BUN SERPL-MCNC: 9 MG/DL (ref 7–20)
CALCIUM SERPL-MCNC: 9.8 MG/DL (ref 8.3–10.6)
CANNABINOIDS UR QL SCN>50 NG/ML: POSITIVE
CHLORIDE SERPL-SCNC: 95 MMOL/L (ref 99–110)
CLARITY UR: ABNORMAL
CO2 SERPL-SCNC: 23 MMOL/L (ref 21–32)
COCAINE UR QL SCN: ABNORMAL
COLOR UR: ABNORMAL
CREAT SERPL-MCNC: <0.5 MG/DL (ref 0.9–1.3)
DEPRECATED RDW RBC AUTO: 17.7 % (ref 12.4–15.4)
DRUG SCREEN COMMENT UR-IMP: ABNORMAL
EOSINOPHIL # BLD: 0 K/UL (ref 0–0.6)
EOSINOPHIL NFR BLD: 0.1 %
EPI CELLS #/AREA URNS AUTO: 7 /HPF (ref 0–5)
ETHANOLAMINE SERPL-MCNC: NORMAL MG/DL (ref 0–0.08)
FENTANYL SCREEN, URINE: ABNORMAL
GFR SERPLBLD CREATININE-BSD FMLA CKD-EPI: >60 ML/MIN/{1.73_M2}
GLUCOSE SERPL-MCNC: 113 MG/DL (ref 70–99)
GLUCOSE UR STRIP.AUTO-MCNC: NEGATIVE MG/DL
HCT VFR BLD AUTO: 36.1 % (ref 40.5–52.5)
HGB BLD-MCNC: 12 G/DL (ref 13.5–17.5)
HGB UR QL STRIP.AUTO: ABNORMAL
HYALINE CASTS #/AREA URNS AUTO: 7 /LPF (ref 0–8)
KETONES UR STRIP.AUTO-MCNC: 80 MG/DL
LACTATE BLDV-SCNC: 1.2 MMOL/L (ref 0.4–1.9)
LEUKOCYTE ESTERASE UR QL STRIP.AUTO: ABNORMAL
LIPASE SERPL-CCNC: 60 U/L (ref 13–60)
LYMPHOCYTES # BLD: 0.9 K/UL (ref 1–5.1)
LYMPHOCYTES NFR BLD: 12.8 %
MAGNESIUM SERPL-MCNC: 2 MG/DL (ref 1.8–2.4)
MCH RBC QN AUTO: 28.7 PG (ref 26–34)
MCHC RBC AUTO-ENTMCNC: 33.2 G/DL (ref 31–36)
MCV RBC AUTO: 86.3 FL (ref 80–100)
METHADONE UR QL SCN>300 NG/ML: ABNORMAL
MONOCYTES # BLD: 0.5 K/UL (ref 0–1.3)
MONOCYTES NFR BLD: 7.7 %
NEUTROPHILS # BLD: 5.4 K/UL (ref 1.7–7.7)
NEUTROPHILS NFR BLD: 78.6 %
NITRITE UR QL STRIP.AUTO: POSITIVE
OPIATES UR QL SCN>300 NG/ML: ABNORMAL
OXYCODONE UR QL SCN: ABNORMAL
PCP UR QL SCN>25 NG/ML: ABNORMAL
PH UR STRIP.AUTO: 6 [PH] (ref 5–8)
PH UR STRIP: 6 [PH]
PLATELET # BLD AUTO: 95 K/UL (ref 135–450)
PMV BLD AUTO: 8.8 FL (ref 5–10.5)
POTASSIUM SERPL-SCNC: 3.4 MMOL/L (ref 3.5–5.1)
PROT SERPL-MCNC: 8 G/DL (ref 6.4–8.2)
PROT UR STRIP.AUTO-MCNC: 100 MG/DL
RBC # BLD AUTO: 4.19 M/UL (ref 4.2–5.9)
RBC CLUMPS #/AREA URNS AUTO: 5 /HPF (ref 0–4)
SODIUM SERPL-SCNC: 134 MMOL/L (ref 136–145)
SP GR UR STRIP.AUTO: >=1.03 (ref 1–1.03)
UA COMPLETE W REFLEX CULTURE PNL UR: ABNORMAL
UA DIPSTICK W REFLEX MICRO PNL UR: YES
URN SPEC COLLECT METH UR: ABNORMAL
UROBILINOGEN UR STRIP-ACNC: 2 E.U./DL
WBC # BLD AUTO: 6.9 K/UL (ref 4–11)
WBC #/AREA URNS AUTO: 1 /HPF (ref 0–5)

## 2023-05-15 PROCEDURE — 80048 BASIC METABOLIC PNL TOTAL CA: CPT

## 2023-05-15 PROCEDURE — 80143 DRUG ASSAY ACETAMINOPHEN: CPT

## 2023-05-15 PROCEDURE — 99285 EMERGENCY DEPT VISIT HI MDM: CPT

## 2023-05-15 PROCEDURE — 2580000003 HC RX 258: Performed by: PHYSICIAN ASSISTANT

## 2023-05-15 PROCEDURE — 96375 TX/PRO/DX INJ NEW DRUG ADDON: CPT

## 2023-05-15 PROCEDURE — 83690 ASSAY OF LIPASE: CPT

## 2023-05-15 PROCEDURE — 82077 ASSAY SPEC XCP UR&BREATH IA: CPT

## 2023-05-15 PROCEDURE — 80307 DRUG TEST PRSMV CHEM ANLYZR: CPT

## 2023-05-15 PROCEDURE — 81003 URINALYSIS AUTO W/O SCOPE: CPT

## 2023-05-15 PROCEDURE — 83735 ASSAY OF MAGNESIUM: CPT

## 2023-05-15 PROCEDURE — 82150 ASSAY OF AMYLASE: CPT

## 2023-05-15 PROCEDURE — 96366 THER/PROPH/DIAG IV INF ADDON: CPT

## 2023-05-15 PROCEDURE — 83605 ASSAY OF LACTIC ACID: CPT

## 2023-05-15 PROCEDURE — 96365 THER/PROPH/DIAG IV INF INIT: CPT

## 2023-05-15 PROCEDURE — 96376 TX/PRO/DX INJ SAME DRUG ADON: CPT

## 2023-05-15 PROCEDURE — 80076 HEPATIC FUNCTION PANEL: CPT

## 2023-05-15 PROCEDURE — 6360000002 HC RX W HCPCS: Performed by: PHYSICIAN ASSISTANT

## 2023-05-15 PROCEDURE — 82140 ASSAY OF AMMONIA: CPT

## 2023-05-15 PROCEDURE — 85025 COMPLETE CBC W/AUTO DIFF WBC: CPT

## 2023-05-15 PROCEDURE — 36415 COLL VENOUS BLD VENIPUNCTURE: CPT

## 2023-05-15 PROCEDURE — 2500000003 HC RX 250 WO HCPCS: Performed by: PHYSICIAN ASSISTANT

## 2023-05-15 RX ORDER — LORAZEPAM 1 MG/1
2 TABLET ORAL
Status: DISCONTINUED | OUTPATIENT
Start: 2023-05-15 | End: 2023-05-17 | Stop reason: HOSPADM

## 2023-05-15 RX ORDER — LORAZEPAM 2 MG/ML
1 INJECTION INTRAMUSCULAR
Status: DISCONTINUED | OUTPATIENT
Start: 2023-05-15 | End: 2023-05-17 | Stop reason: HOSPADM

## 2023-05-15 RX ORDER — IBUPROFEN 800 MG/1
800 TABLET ORAL ONCE
Status: COMPLETED | OUTPATIENT
Start: 2023-05-16 | End: 2023-05-16

## 2023-05-15 RX ORDER — LORAZEPAM 2 MG/ML
2 INJECTION INTRAMUSCULAR
Status: DISCONTINUED | OUTPATIENT
Start: 2023-05-15 | End: 2023-05-17 | Stop reason: HOSPADM

## 2023-05-15 RX ORDER — LORAZEPAM 1 MG/1
3 TABLET ORAL
Status: DISCONTINUED | OUTPATIENT
Start: 2023-05-15 | End: 2023-05-17 | Stop reason: HOSPADM

## 2023-05-15 RX ORDER — ONDANSETRON 2 MG/ML
4 INJECTION INTRAMUSCULAR; INTRAVENOUS ONCE
Status: COMPLETED | OUTPATIENT
Start: 2023-05-15 | End: 2023-05-15

## 2023-05-15 RX ORDER — LORAZEPAM 1 MG/1
4 TABLET ORAL
Status: DISCONTINUED | OUTPATIENT
Start: 2023-05-15 | End: 2023-05-17 | Stop reason: HOSPADM

## 2023-05-15 RX ORDER — SODIUM CHLORIDE 9 MG/ML
INJECTION, SOLUTION INTRAVENOUS PRN
Status: DISCONTINUED | OUTPATIENT
Start: 2023-05-15 | End: 2023-05-17 | Stop reason: HOSPADM

## 2023-05-15 RX ORDER — LORAZEPAM 2 MG/ML
4 INJECTION INTRAMUSCULAR
Status: DISCONTINUED | OUTPATIENT
Start: 2023-05-15 | End: 2023-05-17 | Stop reason: HOSPADM

## 2023-05-15 RX ORDER — SODIUM CHLORIDE 0.9 % (FLUSH) 0.9 %
5-40 SYRINGE (ML) INJECTION PRN
Status: DISCONTINUED | OUTPATIENT
Start: 2023-05-15 | End: 2023-05-17 | Stop reason: HOSPADM

## 2023-05-15 RX ORDER — LORAZEPAM 1 MG/1
1 TABLET ORAL
Status: DISCONTINUED | OUTPATIENT
Start: 2023-05-15 | End: 2023-05-17 | Stop reason: HOSPADM

## 2023-05-15 RX ORDER — SODIUM CHLORIDE 0.9 % (FLUSH) 0.9 %
5-40 SYRINGE (ML) INJECTION EVERY 12 HOURS SCHEDULED
Status: DISCONTINUED | OUTPATIENT
Start: 2023-05-15 | End: 2023-05-17 | Stop reason: HOSPADM

## 2023-05-15 RX ORDER — ATORVASTATIN CALCIUM 10 MG/1
10 TABLET, FILM COATED ORAL DAILY
COMMUNITY

## 2023-05-15 RX ORDER — LORAZEPAM 2 MG/ML
3 INJECTION INTRAMUSCULAR
Status: DISCONTINUED | OUTPATIENT
Start: 2023-05-15 | End: 2023-05-17 | Stop reason: HOSPADM

## 2023-05-15 RX ADMIN — LORAZEPAM 2 MG: 2 INJECTION INTRAMUSCULAR; INTRAVENOUS at 21:58

## 2023-05-15 RX ADMIN — Medication 10 ML: at 21:27

## 2023-05-15 RX ADMIN — LORAZEPAM 2 MG: 2 INJECTION INTRAMUSCULAR; INTRAVENOUS at 23:43

## 2023-05-15 RX ADMIN — FOLIC ACID: 5 INJECTION, SOLUTION INTRAMUSCULAR; INTRAVENOUS; SUBCUTANEOUS at 21:26

## 2023-05-15 RX ADMIN — ONDANSETRON 4 MG: 2 INJECTION INTRAMUSCULAR; INTRAVENOUS at 21:58

## 2023-05-15 ASSESSMENT — ENCOUNTER SYMPTOMS
SHORTNESS OF BREATH: 0
CHEST TIGHTNESS: 0
NAUSEA: 1
COUGH: 0
VOMITING: 0
DIARRHEA: 0
ABDOMINAL PAIN: 1

## 2023-05-15 ASSESSMENT — LIFESTYLE VARIABLES
HOW MANY STANDARD DRINKS CONTAINING ALCOHOL DO YOU HAVE ON A TYPICAL DAY: 10 OR MORE
HOW OFTEN DO YOU HAVE A DRINK CONTAINING ALCOHOL: 4 OR MORE TIMES A WEEK

## 2023-05-16 PROBLEM — F10.939 ALCOHOL WITHDRAWAL SYNDROME WITH COMPLICATION, WITH UNSPECIFIED COMPLICATION (HCC): Status: ACTIVE | Noted: 2023-05-16

## 2023-05-16 LAB — AMMONIA PLAS-SCNC: 79 UMOL/L (ref 16–60)

## 2023-05-16 PROCEDURE — HZ2ZZZZ DETOXIFICATION SERVICES FOR SUBSTANCE ABUSE TREATMENT: ICD-10-PCS | Performed by: HOSPITALIST

## 2023-05-16 PROCEDURE — 6360000002 HC RX W HCPCS: Performed by: PHYSICIAN ASSISTANT

## 2023-05-16 PROCEDURE — 2060000000 HC ICU INTERMEDIATE R&B

## 2023-05-16 PROCEDURE — 6370000000 HC RX 637 (ALT 250 FOR IP): Performed by: INTERNAL MEDICINE

## 2023-05-16 PROCEDURE — 6370000000 HC RX 637 (ALT 250 FOR IP): Performed by: PHYSICIAN ASSISTANT

## 2023-05-16 PROCEDURE — 82140 ASSAY OF AMMONIA: CPT

## 2023-05-16 PROCEDURE — 6360000002 HC RX W HCPCS: Performed by: HOSPITALIST

## 2023-05-16 PROCEDURE — 2580000003 HC RX 258: Performed by: HOSPITALIST

## 2023-05-16 PROCEDURE — 36415 COLL VENOUS BLD VENIPUNCTURE: CPT

## 2023-05-16 PROCEDURE — 2580000003 HC RX 258: Performed by: PHYSICIAN ASSISTANT

## 2023-05-16 PROCEDURE — 2580000003 HC RX 258: Performed by: INTERNAL MEDICINE

## 2023-05-16 RX ORDER — LORAZEPAM 1 MG/1
2 TABLET ORAL
Status: DISCONTINUED | OUTPATIENT
Start: 2023-05-16 | End: 2023-05-16 | Stop reason: SDUPTHER

## 2023-05-16 RX ORDER — LORAZEPAM 1 MG/1
3 TABLET ORAL
Status: DISCONTINUED | OUTPATIENT
Start: 2023-05-16 | End: 2023-05-16 | Stop reason: SDUPTHER

## 2023-05-16 RX ORDER — SODIUM CHLORIDE 9 MG/ML
INJECTION, SOLUTION INTRAVENOUS PRN
Status: DISCONTINUED | OUTPATIENT
Start: 2023-05-16 | End: 2023-05-17 | Stop reason: HOSPADM

## 2023-05-16 RX ORDER — 0.9 % SODIUM CHLORIDE 0.9 %
1000 INTRAVENOUS SOLUTION INTRAVENOUS ONCE
Status: COMPLETED | OUTPATIENT
Start: 2023-05-16 | End: 2023-05-16

## 2023-05-16 RX ORDER — LORAZEPAM 2 MG/ML
2 INJECTION INTRAMUSCULAR
Status: DISCONTINUED | OUTPATIENT
Start: 2023-05-16 | End: 2023-05-16 | Stop reason: SDUPTHER

## 2023-05-16 RX ORDER — SODIUM CHLORIDE 0.9 % (FLUSH) 0.9 %
5-40 SYRINGE (ML) INJECTION EVERY 12 HOURS SCHEDULED
Status: DISCONTINUED | OUTPATIENT
Start: 2023-05-16 | End: 2023-05-17 | Stop reason: HOSPADM

## 2023-05-16 RX ORDER — GAUZE BANDAGE 2" X 2"
100 BANDAGE TOPICAL DAILY
Status: DISCONTINUED | OUTPATIENT
Start: 2023-05-16 | End: 2023-05-17 | Stop reason: HOSPADM

## 2023-05-16 RX ORDER — LORAZEPAM 1 MG/1
1 TABLET ORAL
Status: DISCONTINUED | OUTPATIENT
Start: 2023-05-16 | End: 2023-05-16 | Stop reason: SDUPTHER

## 2023-05-16 RX ORDER — CLONIDINE HYDROCHLORIDE 0.1 MG/1
0.1 TABLET ORAL 3 TIMES DAILY
Status: DISCONTINUED | OUTPATIENT
Start: 2023-05-16 | End: 2023-05-17 | Stop reason: HOSPADM

## 2023-05-16 RX ORDER — LORAZEPAM 1 MG/1
4 TABLET ORAL
Status: DISCONTINUED | OUTPATIENT
Start: 2023-05-16 | End: 2023-05-16 | Stop reason: SDUPTHER

## 2023-05-16 RX ORDER — POLYETHYLENE GLYCOL 3350 17 G/17G
17 POWDER, FOR SOLUTION ORAL DAILY PRN
Status: DISCONTINUED | OUTPATIENT
Start: 2023-05-16 | End: 2023-05-17 | Stop reason: HOSPADM

## 2023-05-16 RX ORDER — ENOXAPARIN SODIUM 100 MG/ML
40 INJECTION SUBCUTANEOUS DAILY
Status: DISCONTINUED | OUTPATIENT
Start: 2023-05-16 | End: 2023-05-17 | Stop reason: HOSPADM

## 2023-05-16 RX ORDER — ACETAMINOPHEN 325 MG/1
650 TABLET ORAL EVERY 6 HOURS PRN
Status: DISCONTINUED | OUTPATIENT
Start: 2023-05-16 | End: 2023-05-17 | Stop reason: HOSPADM

## 2023-05-16 RX ORDER — LORAZEPAM 2 MG/ML
1 INJECTION INTRAMUSCULAR
Status: DISCONTINUED | OUTPATIENT
Start: 2023-05-16 | End: 2023-05-16 | Stop reason: SDUPTHER

## 2023-05-16 RX ORDER — LORAZEPAM 2 MG/ML
3 INJECTION INTRAMUSCULAR
Status: DISCONTINUED | OUTPATIENT
Start: 2023-05-16 | End: 2023-05-16 | Stop reason: SDUPTHER

## 2023-05-16 RX ORDER — ONDANSETRON 2 MG/ML
4 INJECTION INTRAMUSCULAR; INTRAVENOUS EVERY 6 HOURS PRN
Status: DISCONTINUED | OUTPATIENT
Start: 2023-05-16 | End: 2023-05-17 | Stop reason: HOSPADM

## 2023-05-16 RX ORDER — LORAZEPAM 2 MG/ML
4 INJECTION INTRAMUSCULAR
Status: DISCONTINUED | OUTPATIENT
Start: 2023-05-16 | End: 2023-05-16 | Stop reason: SDUPTHER

## 2023-05-16 RX ORDER — ONDANSETRON 4 MG/1
4 TABLET, ORALLY DISINTEGRATING ORAL EVERY 8 HOURS PRN
Status: DISCONTINUED | OUTPATIENT
Start: 2023-05-16 | End: 2023-05-17 | Stop reason: HOSPADM

## 2023-05-16 RX ORDER — SODIUM CHLORIDE 9 MG/ML
INJECTION, SOLUTION INTRAVENOUS CONTINUOUS
Status: DISCONTINUED | OUTPATIENT
Start: 2023-05-16 | End: 2023-05-17 | Stop reason: HOSPADM

## 2023-05-16 RX ORDER — ATORVASTATIN CALCIUM 10 MG/1
10 TABLET, FILM COATED ORAL NIGHTLY
Status: DISCONTINUED | OUTPATIENT
Start: 2023-05-16 | End: 2023-05-17 | Stop reason: HOSPADM

## 2023-05-16 RX ORDER — ACETAMINOPHEN 650 MG/1
650 SUPPOSITORY RECTAL EVERY 6 HOURS PRN
Status: DISCONTINUED | OUTPATIENT
Start: 2023-05-16 | End: 2023-05-17 | Stop reason: HOSPADM

## 2023-05-16 RX ORDER — SODIUM CHLORIDE 0.9 % (FLUSH) 0.9 %
5-40 SYRINGE (ML) INJECTION PRN
Status: DISCONTINUED | OUTPATIENT
Start: 2023-05-16 | End: 2023-05-17 | Stop reason: HOSPADM

## 2023-05-16 RX ADMIN — LORAZEPAM 1 MG: 2 INJECTION INTRAMUSCULAR; INTRAVENOUS at 22:06

## 2023-05-16 RX ADMIN — CLONIDINE HYDROCHLORIDE 0.1 MG: 0.1 TABLET ORAL at 13:43

## 2023-05-16 RX ADMIN — CHLORDIAZEPOXIDE HYDROCHLORIDE 25 MG: 10 CAPSULE ORAL at 13:43

## 2023-05-16 RX ADMIN — LORAZEPAM 1 MG: 1 TABLET ORAL at 03:27

## 2023-05-16 RX ADMIN — CHLORDIAZEPOXIDE HYDROCHLORIDE 25 MG: 10 CAPSULE ORAL at 22:06

## 2023-05-16 RX ADMIN — ATORVASTATIN CALCIUM 10 MG: 10 TABLET, FILM COATED ORAL at 22:06

## 2023-05-16 RX ADMIN — SODIUM CHLORIDE: 9 INJECTION, SOLUTION INTRAVENOUS at 05:23

## 2023-05-16 RX ADMIN — SODIUM CHLORIDE 1000 ML: 9 INJECTION, SOLUTION INTRAVENOUS at 00:06

## 2023-05-16 RX ADMIN — CLONIDINE HYDROCHLORIDE 0.1 MG: 0.1 TABLET ORAL at 08:14

## 2023-05-16 RX ADMIN — Medication 100 MG: at 08:14

## 2023-05-16 RX ADMIN — CLONIDINE HYDROCHLORIDE 0.1 MG: 0.1 TABLET ORAL at 22:06

## 2023-05-16 RX ADMIN — CHLORDIAZEPOXIDE HYDROCHLORIDE 25 MG: 10 CAPSULE ORAL at 08:15

## 2023-05-16 RX ADMIN — IBUPROFEN 800 MG: 800 TABLET, FILM COATED ORAL at 00:05

## 2023-05-16 RX ADMIN — CEFTRIAXONE SODIUM 1000 MG: 1 INJECTION, POWDER, FOR SOLUTION INTRAMUSCULAR; INTRAVENOUS at 10:26

## 2023-05-16 RX ADMIN — SODIUM CHLORIDE: 9 INJECTION, SOLUTION INTRAVENOUS at 17:48

## 2023-05-16 ASSESSMENT — PAIN SCALES - GENERAL
PAINLEVEL_OUTOF10: 0

## 2023-05-16 NOTE — PLAN OF CARE
Pt updated on current plan of care.     Problem: Discharge Planning  Goal: Discharge to home or other facility with appropriate resources  Outcome: Progressing  Flowsheets (Taken 5/16/2023 4515)  Discharge to home or other facility with appropriate resources: Identify barriers to discharge with patient and caregiver     Problem: Safety - Adult  Goal: Free from fall injury  Outcome: Progressing     Problem: ABCDS Injury Assessment  Goal: Absence of physical injury  Outcome: Progressing

## 2023-05-16 NOTE — CARE COORDINATION
Case Management Assessment  Initial Evaluation    Date/Time of Evaluation: 5/16/2023 3:44 PM  Assessment Completed by: Radha Zayas RN    If patient is discharged prior to next notation, then this note serves as note for discharge by case management. Patient Name: Talat Jason                   YOB: 1985  Diagnosis: Transaminitis [R74.01]  Increased ammonia level [R79.89]  Alcohol withdrawal syndrome with complication ([facility] [A57.737]  Alcohol withdrawal syndrome with complication, with unspecified complication ([facility] [J73.759]                   Date / Time: 5/15/2023  7:39 PM    Patient Admission Status: Inpatient   Readmission Risk (Low < 19, Mod (19-27), High > 27): Readmission Risk Score: 16.1    Current PCP: LATHA Jerome CNP  PCP verified by CM? Yes    Chart Reviewed: Yes      History Provided by: Patient  Patient Orientation: Alert and Oriented, Person, Place, Situation    Patient Cognition: Alert    Hospitalization in the last 30 days (Readmission):  No    If yes, Readmission Assessment in CM Navigator will be completed. Advance Directives:      Code Status: Full Code   Patient's Primary Decision Maker is: Legal Next of Kin    Primary Decision Maker: Corazon Hale - Domestic Partner - [phone]    Discharge Planning:    Patient lives with: Alone Type of Home: House  Primary Care Giver: Self  Patient Support Systems include: Family Members, Friends/Neighbors, Other (Comment) (states ex wife is \"somewhat supportive\")   Current Financial resources: Medicaid  Current community resources: Other (Comment) (provided outpt alcohol resources)  Current services prior to admission: None            Current DME:              Type of Home Care services:  None    ADLS  Prior functional level: Independent in ADLs/IADLs  Current functional level: Independent in ADLs/IADLs    PT AM-PAC:   /24  OT AM-PAC:   /24    Family can provide assistance at DC:  Other (comment) (no home care needs

## 2023-05-16 NOTE — H&P
chloride flush, 5-40 mL, PRN  sodium chloride, , PRN  LORazepam, 1 mg, Q1H PRN   Or  LORazepam, 1 mg, Q1H PRN   Or  LORazepam, 2 mg, Q1H PRN   Or  LORazepam, 2 mg, Q1H PRN   Or  LORazepam, 3 mg, Q1H PRN   Or  LORazepam, 3 mg, Q1H PRN   Or  LORazepam, 4 mg, Q1H PRN   Or  LORazepam, 4 mg, Q1H PRN        Labs      CBC:   Recent Labs     05/15/23  2050   WBC 6.9   HGB 12.0*   PLT 95*     BMP:    Recent Labs     05/15/23  2050   *   K 3.4*   CL 95*   CO2 23   BUN 9   CREATININE <0.5*   GLUCOSE 113*     Hepatic:   Recent Labs     05/15/23  2050   *   *   BILITOT 7.7*   ALKPHOS 172*     Lipids: No results found for: CHOL, HDL, TRIG  Hemoglobin A1C:   Lab Results   Component Value Date/Time    LABA1C 5.2 05/30/2018 02:33 PM     TSH: No results found for: TSH  Troponin: No results found for: TROPONINT  Lactic Acid: No results for input(s): LACTA in the last 72 hours. BNP: No results for input(s): PROBNP in the last 72 hours. UA:  Lab Results   Component Value Date/Time    NITRU POSITIVE 05/15/2023 08:50 PM    COLORU ORANGE 05/15/2023 08:50 PM    PHUR 6.0 05/15/2023 08:50 PM    PHUR 6.0 05/15/2023 08:50 PM    WBCUA 1 05/15/2023 08:50 PM    RBCUA 5 05/15/2023 08:50 PM    BACTERIA None Seen 05/15/2023 08:50 PM    CLARITYU CLOUDY 05/15/2023 08:50 PM    SPECGRAV >=1.030 05/15/2023 08:50 PM    LEUKOCYTESUR MODERATE 05/15/2023 08:50 PM    UROBILINOGEN 2.0 05/15/2023 08:50 PM    BILIRUBINUR LARGE 05/15/2023 08:50 PM    BLOODU TRACE 05/15/2023 08:50 PM    GLUCOSEU Negative 05/15/2023 08:50 PM    [address] 05/15/2023 08:50 PM     Urine Cultures: No results found for: LABURIN  Blood Cultures: No results found for: BC  No results found for: BLOODCULT2  Organism: No results found for: ORG    Imaging/Diagnostics Last 24 Hours   No results found.       Electronically signed by Minnette Sandifer, MD on 5/16/2023 at 8:30 AM

## 2023-05-16 NOTE — CARE COORDINATION
CM received call from RN requesting [facility] phone number as pt is willing to talk to them. MAIRA [facility] [phone] with RN on speaker phone also and Lexii Luther will be here between 8:30 and 9 am tomorrow to meet with pt.        Charito Quintanilla RN, BSN  [phone]

## 2023-05-16 NOTE — ED PROVIDER NOTES
% injection 5-40 mL (10 mLs IntraVENous Given 5/15/23 2127)   sodium chloride flush 0.9 % injection 5-40 mL (has no administration in time range)   0.9 % sodium chloride infusion (has no administration in time range)   LORazepam (ATIVAN) tablet 1 mg ( Oral See Alternative 5/15/23 2343)     Or   LORazepam (ATIVAN) injection 1 mg ( IntraVENous See Alternative 5/15/23 2343)     Or   LORazepam (ATIVAN) tablet 2 mg ( Oral See Alternative 5/15/23 2343)     Or   LORazepam (ATIVAN) injection 2 mg (2 mg IntraVENous Given 5/15/23 2343)     Or   LORazepam (ATIVAN) tablet 3 mg ( Oral See Alternative 5/15/23 2343)     Or   LORazepam (ATIVAN) injection 3 mg ( IntraVENous See Alternative 5/15/23 2343)     Or   LORazepam (ATIVAN) tablet 4 mg ( Oral See Alternative 5/15/23 2343)     Or   LORazepam (ATIVAN) injection 4 mg ( IntraVENous See Alternative 5/15/23 2343)   ibuprofen (ADVIL;MOTRIN) tablet 800 mg (has no administration in time range)   0.9 % sodium chloride bolus (has no administration in time range)   sodium chloride 0.9 % 9,026 mL with folic acid 1 mg, adult multi-vitamin with vitamin k 10 mL, thiamine 300 mg ( IntraVENous Stopped 5/15/23 2338)   ondansetron (ZOFRAN) injection 4 mg (4 mg IntraVENous Given 5/15/23 2158)             Is this patient to be included in the SEP-1 Core Measure due to severe sepsis or septic shock? No   Exclusion criteria - the patient is NOT to be included for SEP-1 Core Measure due to: Infection is not suspected    CONSULTS: (Who and What was discussed)  IP CONSULT TO SOCIAL WORK  Discussion with Other Profesionals : None    Social Determinants : None    Records Reviewed : None    CC/HPI Summary, DDx, ED Course, and Reassessment: Patient presented to the emergency department today complaining of acute alcohol withdrawal.  He has been a daily drinker for the last 10 years.   He states his last drink was 8 hours prior to arrival.  He is feeling anxious, has a mild headache, sweaty, and has

## 2023-05-17 VITALS
DIASTOLIC BLOOD PRESSURE: 72 MMHG | WEIGHT: 310.19 LBS | RESPIRATION RATE: 16 BRPM | HEIGHT: 70 IN | TEMPERATURE: 98.3 F | OXYGEN SATURATION: 95 % | BODY MASS INDEX: 44.41 KG/M2 | SYSTOLIC BLOOD PRESSURE: 129 MMHG | HEART RATE: 83 BPM

## 2023-05-17 LAB
ALBUMIN SERPL-MCNC: 3.4 G/DL (ref 3.4–5)
ALP SERPL-CCNC: 145 U/L (ref 40–129)
ALT SERPL-CCNC: 108 U/L (ref 10–40)
ANION GAP SERPL CALCULATED.3IONS-SCNC: 10 MMOL/L (ref 3–16)
AST SERPL-CCNC: 285 U/L (ref 15–37)
BILIRUB DIRECT SERPL-MCNC: 2.6 MG/DL (ref 0–0.3)
BILIRUB INDIRECT SERPL-MCNC: 1.1 MG/DL (ref 0–1)
BILIRUB SERPL-MCNC: 3.7 MG/DL (ref 0–1)
BUN SERPL-MCNC: 5 MG/DL (ref 7–20)
CALCIUM SERPL-MCNC: 9.1 MG/DL (ref 8.3–10.6)
CHLORIDE SERPL-SCNC: 104 MMOL/L (ref 99–110)
CO2 SERPL-SCNC: 26 MMOL/L (ref 21–32)
CREAT SERPL-MCNC: <0.5 MG/DL (ref 0.9–1.3)
GFR SERPLBLD CREATININE-BSD FMLA CKD-EPI: >60 ML/MIN/{1.73_M2}
GLUCOSE SERPL-MCNC: 109 MG/DL (ref 70–99)
LACTATE BLDV-SCNC: 0.6 MMOL/L (ref 0.4–2)
MAGNESIUM SERPL-MCNC: 2.1 MG/DL (ref 1.8–2.4)
POTASSIUM SERPL-SCNC: 3 MMOL/L (ref 3.5–5.1)
PROT SERPL-MCNC: 6.5 G/DL (ref 6.4–8.2)
SODIUM SERPL-SCNC: 140 MMOL/L (ref 136–145)

## 2023-05-17 PROCEDURE — 2580000003 HC RX 258: Performed by: HOSPITALIST

## 2023-05-17 PROCEDURE — 6370000000 HC RX 637 (ALT 250 FOR IP): Performed by: HOSPITALIST

## 2023-05-17 PROCEDURE — 6360000002 HC RX W HCPCS: Performed by: HOSPITALIST

## 2023-05-17 PROCEDURE — 80048 BASIC METABOLIC PNL TOTAL CA: CPT

## 2023-05-17 PROCEDURE — 83605 ASSAY OF LACTIC ACID: CPT

## 2023-05-17 PROCEDURE — 80076 HEPATIC FUNCTION PANEL: CPT

## 2023-05-17 PROCEDURE — 83735 ASSAY OF MAGNESIUM: CPT

## 2023-05-17 PROCEDURE — 6370000000 HC RX 637 (ALT 250 FOR IP): Performed by: INTERNAL MEDICINE

## 2023-05-17 PROCEDURE — 36415 COLL VENOUS BLD VENIPUNCTURE: CPT

## 2023-05-17 RX ORDER — CHLORDIAZEPOXIDE HYDROCHLORIDE 5 MG/1
5 CAPSULE, GELATIN COATED ORAL 3 TIMES DAILY PRN
Qty: 9 CAPSULE | Refills: 0 | Status: SHIPPED | OUTPATIENT
Start: 2023-05-17 | End: 2023-05-20

## 2023-05-17 RX ORDER — POTASSIUM CHLORIDE 7.45 MG/ML
10 INJECTION INTRAVENOUS PRN
Status: DISCONTINUED | OUTPATIENT
Start: 2023-05-17 | End: 2023-05-17 | Stop reason: HOSPADM

## 2023-05-17 RX ORDER — POTASSIUM CHLORIDE 20 MEQ/1
40 TABLET, EXTENDED RELEASE ORAL PRN
Status: DISCONTINUED | OUTPATIENT
Start: 2023-05-17 | End: 2023-05-17 | Stop reason: HOSPADM

## 2023-05-17 RX ADMIN — CLONIDINE HYDROCHLORIDE 0.1 MG: 0.1 TABLET ORAL at 13:47

## 2023-05-17 RX ADMIN — Medication 100 MG: at 08:03

## 2023-05-17 RX ADMIN — CHLORDIAZEPOXIDE HYDROCHLORIDE 25 MG: 10 CAPSULE ORAL at 13:47

## 2023-05-17 RX ADMIN — CHLORDIAZEPOXIDE HYDROCHLORIDE 25 MG: 10 CAPSULE ORAL at 08:03

## 2023-05-17 RX ADMIN — CEFTRIAXONE SODIUM 1000 MG: 1 INJECTION, POWDER, FOR SOLUTION INTRAMUSCULAR; INTRAVENOUS at 08:08

## 2023-05-17 RX ADMIN — CLONIDINE HYDROCHLORIDE 0.1 MG: 0.1 TABLET ORAL at 08:03

## 2023-05-17 RX ADMIN — POTASSIUM CHLORIDE 40 MEQ: 1500 TABLET, EXTENDED RELEASE ORAL at 08:06

## 2023-05-17 ASSESSMENT — PAIN SCALES - GENERAL: PAINLEVEL_OUTOF10: 0

## 2023-05-17 NOTE — CARE COORDINATION
Shalini with [facility] [phone] on unit and states she spoke to pt and he will be going to [facility] at noon on Friday. She states if pt would discharge before Friday to call her. She states pt will go home and pack and then go to [facility]     CM to fax paperwork on discharge to [facility] at : [phone].     Yuly Yarbrough RN, BSN  [phone]

## 2023-05-17 NOTE — FLOWSHEET NOTE
Pt assess completed. Pt ciwa 9 gave 1 mg iv ativan. Pt denies further needs POC discussed with pt and all questions answered.

## 2023-05-17 NOTE — PROGRESS NOTES
Pt discharge instructions given with verbal teach back and understanding. IV and tele removed. Pt discharged to lobby with no complications.

## 2023-05-17 NOTE — PROGRESS NOTES
CLINICAL PHARMACY NOTE: MEDS TO BEDS    Total # of Prescriptions Filled: 1   The following medications were delivered to the patient:  Chlordiazepoxide 5 mg    Additional Documentation:  Connye Buerger RN picked up from Outpatient pharmacy=signed  Jeff Willett CPhT

## 2023-05-17 NOTE — CARE COORDINATION
CM called Shalini with [facility] [phone] and informed pt is discharging today. She will call patient and [facility] CM faxed discharge paperwork to [facility] at : [phone]    Patient discharged 5/17/2023 to home then outpt treatment. All discharge needs met per case management.         Lolis Chan RN, BSN  [phone]

## 2023-05-17 NOTE — PLAN OF CARE
Problem: Discharge Planning  Goal: Discharge to home or other facility with appropriate resources  5/17/2023 0226 by Kaycee Martínez RN  Outcome: Progressing  Flowsheets (Taken 5/17/2023 0226)  Discharge to home or other facility with appropriate resources: Identify discharge learning needs (meds, wound care, etc)     Problem: ABCDS Injury Assessment  Goal: Absence of physical injury  5/17/2023 0226 by Kaycee Martínez RN  Outcome: Progressing  Flowsheets (Taken 5/17/2023 0226)  Absence of Physical Injury: Implement safety measures based on patient assessment

## 2023-05-17 NOTE — PLAN OF CARE
Problem: Discharge Planning  Goal: Discharge to home or other facility with appropriate resources  5/17/2023 0251 by Caroline Correia RN  Outcome: Progressing  Flowsheets (Taken 5/17/2023 0251)  Discharge to home or other facility with appropriate resources: Identify discharge learning needs (meds, wound care, etc)     Problem: Safety - Adult  Goal: Free from fall injury  5/17/2023 0251 by Caroline Correia RN  Outcome: Progressing     Problem: ABCDS Injury Assessment  Goal: Absence of physical injury  5/17/2023 0251 by Caroline Correia RN  Outcome: Progressing  Flowsheets (Taken 5/17/2023 0251)  Absence of Physical Injury: Implement safety measures based on patient assessment     Problem: Metabolic/Fluid and Electrolytes - Adult  Goal: Electrolytes maintained within normal limits  Outcome: Progressing  Flowsheets (Taken 5/17/2023 0251)  Electrolytes maintained within normal limits: Monitor labs and assess patient for signs and symptoms of electrolyte imbalances

## 2023-05-17 NOTE — PROGRESS NOTES
This RN called Shalini with [facility] about patient impending discharge. Shalini stated she will get in contact with the patient and move up [facility]

## 2023-05-18 NOTE — ADT AUTH CERT
-Thiamine banana bag       Hyperammonemia in patient with hepatitis C and chronic alcohol use   -Patient does have elevated LFTs but compared to previous. Improved   -Ammonia level 79   -Continue to monitor may need lactulose.        History of hepatitis C   On chronic hepatitis       UTI empirically with antibiotics for now            MEDICATIONS:   See in review       ORDERS:   Seizure /fall precautions   Alcohol and/or drug assessment cont   Regular diet   Tele   Strict bedrest              Substance-Related Disorders, Adult - Clinical Indications for Admission to Inpatient Care by Victor Manuel Osullivan RN       Review Status Review Entered   Completed 5/17/2023 1155       Created By   Victor Manuel Osullivan RN      Criteria Review      Clinical Indications for Admission to Inpatient Care    Most Recent : Raisa Ruiz Most Recent Date: 5/17/2023 11:55 AM EDT    ( ) Admission to Inpatient Level of Care for Substance-Related Disorder for Adult (ASAM Level    4) is indicated due to  ALL  of the following  [A] [B] [C] [D] [E] (9) (11) (17) (18) (19)    (20) (21) (22):       ( ) Patient risk or severity of substance-related disorder is appropriate to inpatient level of       care, as indicated by the presence of  1 or more  of the following  [F]:          ( ) Withdrawal signs related to alcohol, sedative, or opioid with high-risk indicators, as indicated          by  1 or more  of the following  [G]:             ( ) Alcohol or sedative withdrawal signs with high-risk indicator are present, as indicated by              ALL  of the following  [H] (6) (17) (18) (27) (28):                (X) Signs of withdrawal, as indicated by  1 or more  of the following :                   (X) Heart rate greater than 100 beats per minute                   5/17/2023 11:48 AM EDT by Raisa Ruiz                     -117                   (X) Nausea or vomiting                   5/17/2023 11:48 AM EDT by Raisa Ruiz

## 2023-05-18 NOTE — DISCHARGE SUMMARY
[facility] DISCHARGE SUMMARY    Patient Demographics    Patient. Deysi Garcia  Date of Birth. 1985  MRN. [id number]     Primary care provider. LATHA Chen CNP  (Tel: [phone])    Admit date: 5/15/2023    Discharge date (blank if same as Note Date): 5/17/2023  Note Date: 5/18/2023     Reason for Hospitalization. Chief Complaint   Patient presents with    Alcohol Problem     Pt arrives in the ED with c/o alcohol withdraw. Pt stated last drink was 8 hours go which was 4-5 shots of vodka. Pt typically drinks one 175 ml bottle a day. Pt c/o abd pain. [facility] Course. Acute alcohol withdrawal with delirium  -IV Ativan CIWA protocol Librium. With significant improvement in symptoms  -By [facility] patient will be discharged home and get admitted to [facility] for   -Discharged p.o. Librium    Suspected UTI ruled out    Consults. IP CONSULT TO SOCIAL WORK  IP CONSULT TO SOCIAL WORK    Physical examination on discharge day. /72   Pulse 83   Temp 98.3 °F (36.8 °C) (Oral)   Resp 16   Ht 5' 10\" (1.778 m)   Wt (!) 310 lb 3 oz (140.7 kg)   SpO2 95%   BMI 44.51 kg/m²   General appearance. Alert. Looks comfortable. HEENT. Sclera clear. Moist mucus membranes. Cardiovascular. Regular rate and rhythm, normal S1, S2. No murmur. Respiratory. Not using accessory muscles. Clear to auscultation bilaterally, no wheeze. Gastrointestinal. Abdomen soft, non-tender, not distended, normal bowel sounds  Neurology. Facial symmetry. No speech deficits. Moving all extremities equally. Extremities. No edema in lower extremities. Skin. Warm, dry, normal turgor    Condition at time of discharge stable     Medication instructions provided to patient at discharge.      Medication List        START taking these medications      chlordiazePOXIDE 5 MG capsule  Commonly known as:

## 2025-08-25 ENCOUNTER — OFFICE VISIT (OUTPATIENT)
Dept: INTERNAL MEDICINE CLINIC | Age: 40
End: 2025-08-25
Payer: COMMERCIAL

## 2025-08-25 VITALS
HEIGHT: 68 IN | WEIGHT: 278 LBS | SYSTOLIC BLOOD PRESSURE: 128 MMHG | DIASTOLIC BLOOD PRESSURE: 78 MMHG | TEMPERATURE: 97.4 F | BODY MASS INDEX: 42.13 KG/M2 | OXYGEN SATURATION: 95 % | HEART RATE: 71 BPM

## 2025-08-25 DIAGNOSIS — Z13.220 SCREENING FOR HYPERLIPIDEMIA: ICD-10-CM

## 2025-08-25 DIAGNOSIS — F10.91 ALCOHOL USE DISORDER IN REMISSION: Primary | ICD-10-CM

## 2025-08-25 DIAGNOSIS — Z13.1 SCREENING FOR DIABETES MELLITUS: ICD-10-CM

## 2025-08-25 DIAGNOSIS — Z12.5 ENCOUNTER FOR SCREENING PROSTATE SPECIFIC ANTIGEN (PSA) MEASUREMENT: ICD-10-CM

## 2025-08-25 DIAGNOSIS — S42.92XS: ICD-10-CM

## 2025-08-25 DIAGNOSIS — I10 PRIMARY HYPERTENSION: ICD-10-CM

## 2025-08-25 PROBLEM — S42.92XA CLOSED FRACTURE DISLOCATION OF LEFT SHOULDER JOINT: Status: ACTIVE | Noted: 2025-08-25

## 2025-08-25 PROCEDURE — 3074F SYST BP LT 130 MM HG: CPT

## 2025-08-25 PROCEDURE — 3078F DIAST BP <80 MM HG: CPT

## 2025-08-25 PROCEDURE — 99204 OFFICE O/P NEW MOD 45 MIN: CPT

## 2025-08-25 RX ORDER — BUPRENORPHINE HYDROCHLORIDE AND NALOXONE HYDROCHLORIDE DIHYDRATE 2; .5 MG/1; MG/1
TABLET SUBLINGUAL
COMMUNITY
Start: 2025-08-20

## 2025-08-25 RX ORDER — CLONIDINE HYDROCHLORIDE 0.1 MG/1
TABLET ORAL
COMMUNITY
Start: 2025-08-20

## 2025-08-25 RX ORDER — FOLIC ACID 1 MG/1
TABLET ORAL
COMMUNITY
Start: 2025-08-20

## 2025-08-25 RX ORDER — POLYETHYLENE GLYCOL 3350 17 G/17G
POWDER ORAL
COMMUNITY
Start: 2025-08-21

## 2025-08-25 SDOH — ECONOMIC STABILITY: FOOD INSECURITY: WITHIN THE PAST 12 MONTHS, THE FOOD YOU BOUGHT JUST DIDN'T LAST AND YOU DIDN'T HAVE MONEY TO GET MORE.: NEVER TRUE

## 2025-08-25 SDOH — ECONOMIC STABILITY: FOOD INSECURITY: WITHIN THE PAST 12 MONTHS, YOU WORRIED THAT YOUR FOOD WOULD RUN OUT BEFORE YOU GOT MONEY TO BUY MORE.: NEVER TRUE

## 2025-08-25 ASSESSMENT — PATIENT HEALTH QUESTIONNAIRE - PHQ9
SUM OF ALL RESPONSES TO PHQ QUESTIONS 1-9: 0
SUM OF ALL RESPONSES TO PHQ QUESTIONS 1-9: 0
2. FEELING DOWN, DEPRESSED OR HOPELESS: NOT AT ALL
SUM OF ALL RESPONSES TO PHQ QUESTIONS 1-9: 0
SUM OF ALL RESPONSES TO PHQ QUESTIONS 1-9: 0
1. LITTLE INTEREST OR PLEASURE IN DOING THINGS: NOT AT ALL

## 2025-08-29 ENCOUNTER — OFFICE VISIT (OUTPATIENT)
Dept: ORTHOPEDIC SURGERY | Age: 40
End: 2025-08-29

## 2025-08-29 VITALS — WEIGHT: 281.7 LBS | HEIGHT: 68 IN | BODY MASS INDEX: 42.69 KG/M2

## 2025-08-29 DIAGNOSIS — M25.512 LEFT SHOULDER PAIN, UNSPECIFIED CHRONICITY: ICD-10-CM

## 2025-08-29 DIAGNOSIS — S42.255A CLOSED NONDISPLACED FRACTURE OF GREATER TUBEROSITY OF LEFT HUMERUS, INITIAL ENCOUNTER: Primary | ICD-10-CM

## 2025-08-29 RX ORDER — BUSPIRONE HYDROCHLORIDE 7.5 MG/1
TABLET ORAL
COMMUNITY
Start: 2025-08-25

## 2025-08-29 RX ORDER — HYDROXYZINE PAMOATE 50 MG/1
CAPSULE ORAL
COMMUNITY
Start: 2025-08-25

## 2025-08-29 RX ORDER — IBUPROFEN 800 MG/1
800 TABLET, FILM COATED ORAL EVERY 8 HOURS PRN
COMMUNITY
Start: 2025-07-30

## 2025-08-29 RX ORDER — IBUPROFEN 800 MG/1
800 TABLET, FILM COATED ORAL EVERY 8 HOURS PRN
Qty: 90 TABLET | Refills: 0 | Status: SHIPPED | OUTPATIENT
Start: 2025-08-29 | End: 2025-09-28

## 2025-09-02 ENCOUNTER — TELEPHONE (OUTPATIENT)
Dept: ORTHOPEDIC SURGERY | Age: 40
End: 2025-09-02

## 2025-09-02 DIAGNOSIS — S42.255A CLOSED NONDISPLACED FRACTURE OF GREATER TUBEROSITY OF LEFT HUMERUS, INITIAL ENCOUNTER: Primary | ICD-10-CM
